# Patient Record
Sex: MALE | Race: WHITE | NOT HISPANIC OR LATINO | Employment: FULL TIME | URBAN - METROPOLITAN AREA
[De-identification: names, ages, dates, MRNs, and addresses within clinical notes are randomized per-mention and may not be internally consistent; named-entity substitution may affect disease eponyms.]

---

## 2017-01-16 ENCOUNTER — ALLSCRIPTS OFFICE VISIT (OUTPATIENT)
Dept: OTHER | Facility: OTHER | Age: 27
End: 2017-01-16

## 2017-01-16 ENCOUNTER — TRANSCRIBE ORDERS (OUTPATIENT)
Dept: ADMINISTRATIVE | Facility: HOSPITAL | Age: 27
End: 2017-01-16

## 2017-01-16 DIAGNOSIS — G47.19 TRANSIENT DISORDER OF INITIATING OR MAINTAINING WAKEFULNESS: Primary | ICD-10-CM

## 2017-01-16 DIAGNOSIS — F41.0 PANIC DISORDER WITHOUT AGORAPHOBIA: ICD-10-CM

## 2017-02-16 ENCOUNTER — HOSPITAL ENCOUNTER (OUTPATIENT)
Dept: SLEEP CENTER | Facility: CLINIC | Age: 27
Discharge: HOME | End: 2017-02-16
Payer: COMMERCIAL

## 2017-02-16 DIAGNOSIS — F41.0 PANIC DISORDER WITHOUT AGORAPHOBIA: ICD-10-CM

## 2017-02-16 DIAGNOSIS — G47.19 TRANSIENT DISORDER OF INITIATING OR MAINTAINING WAKEFULNESS: ICD-10-CM

## 2017-02-16 PROCEDURE — 95810 POLYSOM 6/> YRS 4/> PARAM: CPT

## 2017-02-21 ENCOUNTER — TRANSCRIBE ORDERS (OUTPATIENT)
Dept: SLEEP CENTER | Facility: CLINIC | Age: 27
End: 2017-02-21

## 2017-02-21 DIAGNOSIS — G47.61 PERIODIC LIMB MOVEMENTS OF SLEEP: Primary | ICD-10-CM

## 2017-03-21 ENCOUNTER — HOSPITAL ENCOUNTER (OUTPATIENT)
Dept: SLEEP CENTER | Facility: CLINIC | Age: 27
Discharge: HOME | End: 2017-03-21
Payer: COMMERCIAL

## 2017-03-21 ENCOUNTER — TRANSCRIBE ORDERS (OUTPATIENT)
Dept: SLEEP CENTER | Facility: CLINIC | Age: 27
End: 2017-03-21

## 2017-03-21 DIAGNOSIS — G47.33 OSA (OBSTRUCTIVE SLEEP APNEA): Primary | ICD-10-CM

## 2017-03-21 DIAGNOSIS — G47.61 PERIODIC LIMB MOVEMENTS OF SLEEP: ICD-10-CM

## 2017-03-25 ENCOUNTER — APPOINTMENT (OUTPATIENT)
Dept: LAB | Facility: MEDICAL CENTER | Age: 27
End: 2017-03-25
Payer: COMMERCIAL

## 2017-03-25 ENCOUNTER — TRANSCRIBE ORDERS (OUTPATIENT)
Dept: ADMINISTRATIVE | Facility: HOSPITAL | Age: 27
End: 2017-03-25

## 2017-03-25 DIAGNOSIS — D64.9 ANEMIA, UNSPECIFIED TYPE: Primary | ICD-10-CM

## 2017-03-25 LAB — FERRITIN SERPL-MCNC: 142 NG/ML (ref 8–388)

## 2017-03-25 PROCEDURE — 82728 ASSAY OF FERRITIN: CPT | Performed by: INTERNAL MEDICINE

## 2017-03-25 PROCEDURE — 36415 COLL VENOUS BLD VENIPUNCTURE: CPT | Performed by: INTERNAL MEDICINE

## 2017-05-03 ENCOUNTER — TRANSCRIBE ORDERS (OUTPATIENT)
Dept: SLEEP CENTER | Facility: CLINIC | Age: 27
End: 2017-05-03

## 2017-05-03 ENCOUNTER — HOSPITAL ENCOUNTER (OUTPATIENT)
Dept: SLEEP CENTER | Facility: CLINIC | Age: 27
Discharge: HOME | End: 2017-05-03
Payer: COMMERCIAL

## 2017-05-03 DIAGNOSIS — G47.33 OSA (OBSTRUCTIVE SLEEP APNEA): ICD-10-CM

## 2017-05-03 DIAGNOSIS — G47.61 PLMD (PERIODIC LIMB MOVEMENT DISORDER): Primary | ICD-10-CM

## 2017-10-23 ENCOUNTER — ALLSCRIPTS OFFICE VISIT (OUTPATIENT)
Dept: OTHER | Facility: OTHER | Age: 27
End: 2017-10-23

## 2017-10-24 NOTE — PROGRESS NOTES
Assessment  1  Panic disorder (300 01) (F41 0)   2  Restless legs syndrome (333 94) (G25 81)    Plan  Insomnia    · Venlafaxine HCl  MG Oral Tablet Extended Release 24 Hour; 1 Tab daily  Restless legs syndrome    · Gabapentin 300 MG Oral Capsule; take 1 capsule at bedtime nightly    Discussion/Summary  Discussion Summary:   CONTINUE VENLAFAXINE DAILY- FOLLOW UP IN 6-8 MONTHS- INCREASE TO GABAPENTIN  - AND UP  MG DAILY; NO BETTER CONSIDER MIRAPEX OR PRAMIPEXOLEUP IN 6-8 MONTHSHAS SEEN DR Shay Engle- SLEEP STUDY WAS DONE- NOTES REVIEWED; TO QUIT SMOKING- HE IS WEANING DOWN ON CIGARETTES  Chief Complaint  Chief Complaint Free Text Note Form: patient here for follow upWAS SEEN BY ALLERGIST- HE WAS PUT ON SINGULAIR - NO CHANGE BUT HE STOPPPED SNORING; History of Present Illness  Restless Legs Syndrome: The patient is being seen for follow-up from urgent care for restless legs syndrome  Symptoms:  spontaneous leg movements,-- urge to move legs-- and-- sleep disruption, but-- no abnormal sensation in legs,-- no leg numbness,-- no leg pain,-- no fatigue,-- no anxiety-- and-- no daytime somnolence  The patient is currently experiencing symptoms  Current treatment includes caffeine avoidance and bed time stretching  By report, there is good adherence with treatment, fair tolerance of treatment and fair symptom control  HPI: HE HAD SLEEP STUDY DONE- WAS PUT ON GABASPENTIN FOR RLS; HE FAILED MONTELUKAST FOR ALLERGY SX- BUT IT DID HELP HIS SNORING- HE HAD SLEEP STUDY- REVIEWED; Panic Disorder (Follow-Up): The patient is being seen for follow-up of panic disorder  The patient reports doing well  He has had no significant interval events  Interval symptoms:  denies panic attacks,-- denies fear of dying,-- denies fear of subsequent attacks,-- denies phobic avoidance behavior-- and-- denies suicidal ideation     Associated symptoms: no palpitations,-- no chest discomfort,-- no trouble breathing,-- no trembling,-- no paresthesias,-- no dizziness,-- no lump in throat,-- no nausea-- and-- no hot flashes  Medications:  the patient is adherent to his medication regimen, but-- he denies medication side effects  Disease management:  the patient is doing well with his goals  Review of Systems  Complete-Male:   Constitutional: No fever or chills, feels well, no tiredness, no recent weight gain or weight loss  Eyes: No complaints of eye pain, no red eyes, no discharge from eyes, no itchy eyes  ENT: no complaints of earache, no hearing loss, no nosebleeds, no nasal discharge, no sore throat, no hoarseness  Cardiovascular: No complaints of slow heart rate, no fast heart rate, no chest pain, no palpitations, no leg claudication, no lower extremity  Respiratory: No complaints of shortness of breath, no wheezing, no cough, no SOB on exertion, no orthopnea or PND  Gastrointestinal: No complaints of abdominal pain, no constipation, no nausea or vomiting, no diarrhea or bloody stools  Genitourinary: No complaints of dysuria, no incontinence, no hesitancy, no nocturia, no genital lesion, no testicular pain  Musculoskeletal: No complaints of arthralgia, no myalgias, no joint swelling or stiffness, no limb pain or swelling  Integumentary: No complaints of skin rash or skin lesions, no itching, no skin wound, no dry skin  Neurological: No compliants of headache, no confusion, no convulsions, no numbness or tingling, no dizziness or fainting, no limb weakness, no difficulty walking  Psychiatric: sleep disturbances-- and-- RLS, but-- Is not suicidal, no sleep disturbances, no anxiety or depression, no change in personality, no emotional problems-- and-- as noted in HPI  Endocrine: No complaints of proptosis, no hot flashes, no muscle weakness, no erectile dysfunction, no deepening of the voice, no feelings of weakness,-- no proptosis-- and-- no hot flashes     Hematologic/Lymphatic: No complaints of swollen glands, no swollen glands in the neck, does not bleed easily, no easy bruising,-- no tendency for easy bleeding-- and-- no tendency for easy bruising  ROS Reviewed:   ROS reviewed  Active Problems  1  Daytime hypersomnolence (780 54) (G47 19)   2  Insomnia (780 52) (G47 00)   3  Need for influenza vaccination (V04 81) (Z23)   4  Panic disorder (300 01) (F41 0)   5  Seasonal allergies (477 9) (J30 2)   6  Tobacco use (305 1) (Z72 0)    Past Medical History  1  History of Neck pain (723 1) (M54 2)   2  History of Rotator cuff tendinitis, unspecified laterality (726 10) (M75 80)  Active Problems And Past Medical History Reviewed: The active problems and past medical history were reviewed and updated today  Surgical History  1  History of Tonsillectomy With Adenoidectomy  Surgical History Reviewed: The surgical history was reviewed and updated today  Family History  Mother    1  Family history of Rheumatoid Arthritis  Father    2  Family history of Type 2 Diabetes Mellitus  Family History    3  Family history of Depression  Family History Reviewed: The family history was reviewed and updated today  Social History   · Current Every Day Smoker (305 1)   · Tobacco use (305 1) (Z72 0)  Social History Reviewed: The social history was reviewed and updated today  The social history was reviewed and is unchanged  Current Meds   1  ClonazePAM 0 5 MG Oral Tablet; 1-2  Q8 HOURS PRN ANXIETY- THIS REPLACES ATIVAN; Therapy: 21Jun2016 to (Evaluate:[id number]); Last Rx:21Jun2016 Ordered   2  Venlafaxine HCl  MG Oral Tablet Extended Release 24 Hour; 1 Tab daily; Therapy: [id number] to 9845 8544)  Requested for: [id number]; Last Rx:16Jan2017   Ordered  Medication List Reviewed: The medication list was reviewed and updated today  Allergies  1   No Known Drug Allergies    Vitals  Vital Signs    Recorded: [id number] 08:00AM   Temperature 97 F   Heart Rate 74   Respiration 16 Systolic 860   Diastolic 78   Height 5 ft 10 in   Weight 241 lb    BMI Calculated 34 58   BSA Calculated 2 26     Physical Exam    Constitutional   General appearance: No acute distress, well appearing and well nourished  Eyes   Conjunctiva and lids: No swelling, erythema, or discharge  Pupils and irises: Equal, round and reactive to light  Ears, Nose, Mouth, and Throat   External inspection of ears and nose: Normal     Oropharynx: Normal with no erythema, edema, exudate or lesions  -- CROWDED  Pulmonary   Respiratory effort: No increased work of breathing or signs of respiratory distress  Auscultation of lungs: Clear to auscultation, equal breath sounds bilaterally, no wheezes, no rales, no rhonci  Cardiovascular   Palpation of heart: Normal PMI, no thrills  Auscultation of heart: Normal rate and rhythm, normal S1 and S2, without murmurs  Examination of extremities for edema and/or varicosities: Normal     Carotid pulses: Normal     Abdomen   Abdomen: Non-tender, no masses  Liver and spleen: No hepatomegaly or splenomegaly  Lymphatic   Palpation of lymph nodes in neck: No lymphadenopathy  Musculoskeletal   Gait and station: Normal     Digits and nails: Normal without clubbing or cyanosis  Inspection/palpation of joints, bones, and muscles: Normal     Skin   Skin and subcutaneous tissue: Normal without rashes or lesions  Neurologic   Cranial nerves: Cranial nerves 2-12 intact  Reflexes: 2+ and symmetric  Sensation: No sensory loss  Psychiatric   Orientation to person, place and time: Normal          Results/Data  PHQ-2 Adult Depression Screening 23Oct2017 08:05AM User, [facility]     Test Name Result Flag Reference   PHQ-2 Adult Depression Score 0     Over the last two weeks, how often have you been bothered by any of the following problems?   Little interest or pleasure in doing things: Not at all - 0  Feeling down, depressed, or hopeless: Not at all - 0   PHQ-2 Adult Depression Screening Negative         Signatures   Electronically signed by :  Izabella 89, DO; Oct 23 2017  8:41AM EST                       (Author)

## 2018-01-10 NOTE — PROGRESS NOTES
Assessment    1  URI (upper respiratory infection) (465 9) (J06 9)    Plan  URI (upper respiratory infection)    · Azithromycin 250 MG Oral Tablet; TAKE 2 TABLETS ON DAY 1 THEN TAKE 1  TABLET A DAY FOR 4 DAYS   · Fluticasone Propionate 50 MCG/ACT Nasal Suspension; USE 2 SPRAYS IN EACH  NOSTRIL ONCE DAILY   · PredniSONE 10 MG Oral Tablet; 1 PO BID FOR 3 DAYS    Discussion/Summary    ADD ABX  ADD PRED  ADD NASAL SPRAY  CALL IF NO BETTER  The patient was counseled regarding diagnostic results, instructions for management, risk factor reductions, prognosis, patient and family education, impressions, risks and benefits of treatment options, importance of compliance with treatment  Chief Complaint    1  Cold Symptoms  PATIENT WITH RUNNY STUFFY NOSE; FOR 4 DAYS;   HE HAS NO FEVER; HE USED DAYQUIL AND MUCINEX - NO HELP;      History of Present Illness  HPI: PATIENT HAS URI SYMTPOMS - HE IS A CURRENT EVERY DAY SMOKER; HE GETS A PULSE SENSATION IN HIS RIGHT EYE   Cold Symptoms: Merlin Rouleau presents with complaints of cold symptoms  Associated symptoms include nasal congestion, runny nose, post nasal drainage, dry cough, facial pressure, facial pain and plugged ear(s), but no sneezing, no scratchy throat, no sore throat, no hoarseness, no productive cough, no headache, no ear pain, no swollen lymph nodes, no wheezing, no shortness of breath, no fatigue, no weakness, no nausea, no vomiting, no diarrhea and no fever  Review of Systems    Constitutional: feeling poorly and feeling tired, but as noted in HPI    ENT: earache, nasal discharge and hoarseness, but as noted in HPI and no sore throat  Respiratory: cough, but no complaints of shortness of breath, no wheezing or cough, no dyspnea on exertion, no orthopnea or PND and as noted in HPI  ROS reviewed  Active Problems    1  Cellulitis, toe (681 10) (L03 039)   2  Insomnia (780 52) (G47 00)   3  Left ankle sprain (845 00) (S9 402A)   4   Tobacco use (305  1) (Z72 0)   5  URI (upper respiratory infection) (465 9) (J06 9)    Social History    · Current Every Day Smoker (305 1)   · Tobacco use (305 1) (Z72 0)    Family History  Family History Reviewed: The family history was reviewed and updated today  Current Meds   1  No Reported Medications Recorded    Allergies    1  No Known Drug Allergies    Vitals   Recorded: 63KLX4019 03:38PM   Temperature 96 4 F   Heart Rate 72   Respiration 16   Systolic 025   Diastolic 88   Height 5 ft 10 in   Weight 225 lb 6 08 oz   BMI Calculated 32 34   BSA Calculated 2 2     Physical Exam    Constitutional   General appearance: No acute distress, well appearing and well nourished  Eyes   Conjunctiva and lids: No swelling, erythema, or discharge  SCLERA CLEAR  Pupils and irises: Equal, round and reactive to light  Ears, Nose, Mouth, and Throat   External inspection of ears and nose: Normal     Otoscopic examination: Tympanic membrance translucent with normal light reflex  Canals patent without erythema  Nasal mucosa, septum, and turbinates: Abnormal   NASAL CONGESTION  Oropharynx: Normal with no erythema, edema, exudate or lesions  SOME POST NASAL DRIP  Pulmonary   Respiratory effort: No increased work of breathing or signs of respiratory distress  Auscultation of lungs: Clear to auscultation, equal breath sounds bilaterally, no wheezes, no rales, no rhonci  CLEAR B/L  Cardiovascular   Palpation of heart: Normal PMI, no thrills  REGULAR  Auscultation of heart: Normal rate and rhythm, normal S1 and S2, without murmurs  Examination of extremities for edema and/or varicosities: Normal     Carotid pulses: Normal     Abdomen   Abdomen: Non-tender, no masses  Liver and spleen: No hepatomegaly or splenomegaly  Lymphatic   Palpation of lymph nodes in neck: No lymphadenopathy  Musculoskeletal   Gait and station: Normal     Digits and nails: Normal without clubbing or cyanosis  Inspection/palpation of joints, bones, and muscles: Normal     Skin   Skin and subcutaneous tissue: Normal without rashes or lesions  Neurologic   Cranial nerves: Cranial nerves 2-12 intact  Reflexes: 2+ and symmetric  Sensation: No sensory loss  Psychiatric   Orientation to person, place and time: Normal     Mood and affect: Normal          Signatures   Electronically signed by :  Estela Berry DO; Feb  3 2016 11:30AM EST                       (Author)

## 2018-01-14 VITALS
RESPIRATION RATE: 16 BRPM | BODY MASS INDEX: 34.5 KG/M2 | HEART RATE: 74 BPM | WEIGHT: 241 LBS | TEMPERATURE: 97 F | DIASTOLIC BLOOD PRESSURE: 78 MMHG | HEIGHT: 70 IN | SYSTOLIC BLOOD PRESSURE: 118 MMHG

## 2018-01-15 VITALS
DIASTOLIC BLOOD PRESSURE: 84 MMHG | BODY MASS INDEX: 33.01 KG/M2 | RESPIRATION RATE: 16 BRPM | SYSTOLIC BLOOD PRESSURE: 124 MMHG | HEIGHT: 70 IN | TEMPERATURE: 96.7 F | WEIGHT: 230.6 LBS | HEART RATE: 72 BPM

## 2018-01-15 NOTE — MISCELLANEOUS
Message  Return to work or school:   Miladis Ybarra is under my professional care  He was seen in my office on   He is able to return to work on  05/27/2016    He can perform work without limitations           Signatures   Electronically signed by : MARITZA Carvajal ; May 20 2016  4:28PM EST                       (Author)

## 2018-01-15 NOTE — RESULT NOTES
Verified Results  * MRI BRAIN W WO CONTRAST [id number] 07:18PM Shayna Liu Order Number: [id number]     Test Name Result Flag Reference   MRI BRAIN W [address] (Report)     MRI BRAIN WITH AND WITHOUT CONTRAST     INDICATION: 29-year-old male, dizziness, giddiness   COMPARISON: 4/14/2004 CT     TECHNIQUE:   Sagittal T1, axial T2, axial FLAIR, axial T1, axial Gradient, axial diffusion  Sagittal, axial and coronal T1 postcontrast  Axial BRAVO post contrast     10 mL of Gadavist was injected intravenously without immediate consequence  IMAGE QUALITY:  Diagnostic  FINDINGS:     BRAIN PARENCHYMA: There is no discrete mass, mass effect or midline shift  No abnormal white matter signal identified  Brainstem and cerebellum demonstrate normal signal  There is no intracranial hemorrhage  There is no evidence of acute infarction and   diffusion imaging is unremarkable  Postcontrast imaging of the brain demonstrates no abnormal enhancement  VENTRICLES: Normal      SELLA AND PITUITARY GLAND: Normal      ORBITS: Normal      PARANASAL SINUSES: Mild pansinus mucosal thickening  No air-fluid levels  VASCULATURE: Evaluation of the major intracranial vasculature demonstrates appropriate flow voids  CALVARIUM AND SKULL BASE: Normal      EXTRACRANIAL SOFT TISSUES: Normal        IMPRESSION:   Mild pansinus disease  No air-fluid levels       No significant intracranial abnormalities identified, consistent with prior CT           Workstation performed: [id number]     Signed by:   Shakila Gomes MD   5/17/16

## 2018-03-23 DIAGNOSIS — F34.1 DYSTHYMIA: Primary | ICD-10-CM

## 2018-03-23 RX ORDER — VENLAFAXINE HYDROCHLORIDE 225 MG/1
TABLET, EXTENDED RELEASE ORAL
Qty: 90 TABLET | Refills: 1 | Status: SHIPPED | OUTPATIENT
Start: 2018-03-23 | End: 2018-09-18 | Stop reason: SDUPTHER

## 2018-07-19 ENCOUNTER — TELEPHONE (OUTPATIENT)
Dept: FAMILY MEDICINE CLINIC | Facility: CLINIC | Age: 28
End: 2018-07-19

## 2018-07-19 NOTE — TELEPHONE ENCOUNTER
Patient called back and scheduled an appointment  Did not want to see anyone but you  He needed an evening appointment, first avail was 09/18   He wants to know if you will give him just a few to hold him over

## 2018-07-19 NOTE — TELEPHONE ENCOUNTER
Patient called for a refill on a medication that had not been refilled in 2 years  Patient has not been seen since 10/2017  As per Dr Amol Llamas, patient needs an appointment before refill can be given  (he can be scheduled with any doctor)  Left message for pt to return call

## 2018-09-18 ENCOUNTER — OFFICE VISIT (OUTPATIENT)
Dept: FAMILY MEDICINE CLINIC | Facility: CLINIC | Age: 28
End: 2018-09-18
Payer: COMMERCIAL

## 2018-09-18 VITALS
DIASTOLIC BLOOD PRESSURE: 84 MMHG | BODY MASS INDEX: 36.54 KG/M2 | HEIGHT: 71 IN | TEMPERATURE: 98.5 F | WEIGHT: 261 LBS | SYSTOLIC BLOOD PRESSURE: 128 MMHG

## 2018-09-18 DIAGNOSIS — R20.0 NUMBNESS AND TINGLING OF BOTH FEET: ICD-10-CM

## 2018-09-18 DIAGNOSIS — R20.2 NUMBNESS AND TINGLING OF BOTH FEET: ICD-10-CM

## 2018-09-18 DIAGNOSIS — F34.1 DYSTHYMIA: ICD-10-CM

## 2018-09-18 DIAGNOSIS — R20.2 NUMBNESS AND TINGLING IN BOTH HANDS: ICD-10-CM

## 2018-09-18 DIAGNOSIS — R20.0 NUMBNESS AND TINGLING IN BOTH HANDS: ICD-10-CM

## 2018-09-18 DIAGNOSIS — F41.0 PANIC DISORDER: Primary | ICD-10-CM

## 2018-09-18 PROCEDURE — 1036F TOBACCO NON-USER: CPT | Performed by: INTERNAL MEDICINE

## 2018-09-18 PROCEDURE — 3008F BODY MASS INDEX DOCD: CPT | Performed by: INTERNAL MEDICINE

## 2018-09-18 PROCEDURE — 99213 OFFICE O/P EST LOW 20 MIN: CPT | Performed by: INTERNAL MEDICINE

## 2018-09-18 RX ORDER — GABAPENTIN 300 MG/1
CAPSULE ORAL
COMMUNITY
Start: 2017-10-23 | End: 2018-09-18 | Stop reason: SDUPTHER

## 2018-09-18 RX ORDER — VENLAFAXINE HYDROCHLORIDE 225 MG/1
225 TABLET, EXTENDED RELEASE ORAL DAILY
Qty: 90 TABLET | Refills: 2 | Status: SHIPPED | OUTPATIENT
Start: 2018-09-18 | End: 2018-12-14

## 2018-09-18 RX ORDER — GABAPENTIN 300 MG/1
300 CAPSULE ORAL 3 TIMES DAILY
Qty: 270 CAPSULE | Refills: 0 | Status: SHIPPED | OUTPATIENT
Start: 2018-09-18 | End: 2018-09-18 | Stop reason: SDUPTHER

## 2018-09-18 RX ORDER — VENLAFAXINE HYDROCHLORIDE 225 MG/1
225 TABLET, EXTENDED RELEASE ORAL DAILY
Qty: 90 TABLET | Refills: 0 | Status: SHIPPED | OUTPATIENT
Start: 2018-09-18 | End: 2019-01-22 | Stop reason: SDUPTHER

## 2018-09-18 RX ORDER — CLONAZEPAM 0.5 MG/1
TABLET ORAL
COMMUNITY
Start: 2016-06-21 | End: 2019-01-22

## 2018-09-18 RX ORDER — GABAPENTIN 300 MG/1
300 CAPSULE ORAL 3 TIMES DAILY
Qty: 270 CAPSULE | Refills: 0 | Status: SHIPPED | OUTPATIENT
Start: 2018-09-18 | End: 2018-11-19 | Stop reason: SDUPTHER

## 2018-09-18 RX ORDER — VENLAFAXINE HYDROCHLORIDE 225 MG/1
1 TABLET, EXTENDED RELEASE ORAL DAILY
COMMUNITY
Start: 2017-01-16 | End: 2018-09-18 | Stop reason: SDUPTHER

## 2018-09-18 NOTE — PROGRESS NOTES
ASSESSMENT and PLAN:  Jose Howard is a 33 y o  male with:   Problem List Items Addressed This Visit     Panic disorder - Primary     He is on venlafaxine 225 mg daily  Consider referral to psychiatry  Increase gabapentin to 300 tid- slow titration  If no better consider changing/titrating up duloxitine   Concentration issues may be due to partially treated anxiety   Could have an element of adhd or bipolar disorder-  Follow up for change in meds if no better- pt to call with update  he uses clonazepam rarely         Relevant Medications    venlafaxine 225 MG TB24    venlafaxine 225 MG TB24    gabapentin (NEURONTIN) 300 mg capsule    Numbness and tingling in both hands     Consider labs  Increase gabapentin  Follow up   Add vitamin b6         Relevant Medications    gabapentin (NEURONTIN) 300 mg capsule    Numbness and tingling of both feet     Increase gabapentin  If nob mono check labs, consider neurology referral    Add vitamin b6 50 mg daily         Relevant Medications    gabapentin (NEURONTIN) 300 mg capsule    Dysthymia    Relevant Medications    venlafaxine 225 MG TB24    venlafaxine 225 MG TB24    gabapentin (NEURONTIN) 300 mg capsule          SUBJECTIVE:  Jose Howard is a 33 y o  male who presents today with a chief complaint of No chief complaint on file  Patient here for follow up  He has a few concerns- his hands nad feet are numb "all the time  He quit smoking- he gained weight   He gets sudden onset of anger - out of the blue- or he gets very sad all of a sudden- has been for year  Worse lately  He has troulbe focusing; he is going for his bachelors degree  Review of Systems   Constitutional: Negative  HENT: Negative  Eyes: Negative  Respiratory: Negative  Cardiovascular: Negative  Gastrointestinal: Negative  Endocrine: Negative  Genitourinary: Negative  Musculoskeletal: Negative  Skin: Negative  Allergic/Immunologic: Negative      Neurological: Positive for numbness (numbness of hands and feet )  Negative for seizures, syncope and speech difficulty  Psychiatric/Behavioral: Positive for agitation (pt gets sudden onset of anger, he gets anry or sad- the next day he is fine ) and behavioral problems  The patient is nervous/anxious  I have reviewed the patient's PMH, Social History, Medication List and Allergies  OBJECTIVE:  /84   Temp 98 5 °F (36 9 °C)   Ht 5' 11" (1 803 m)   Wt 118 kg (261 lb)   BMI 36 40 kg/m²   Physical Exam   Constitutional: He is oriented to person, place, and time  He appears well-developed and well-nourished  HENT:   Head: Normocephalic and atraumatic  Right Ear: External ear normal    Left Ear: External ear normal    Nose: Nose normal    Mouth/Throat: Oropharynx is clear and moist    Eyes: Conjunctivae and EOM are normal  Pupils are equal, round, and reactive to light  Neck: Normal range of motion  Neck supple  No JVD present  No tracheal deviation present  No thyromegaly present  Cardiovascular: Normal rate, regular rhythm, normal heart sounds and intact distal pulses  Pulmonary/Chest: Effort normal and breath sounds normal  No respiratory distress  He has no wheezes  Abdominal: Soft  Bowel sounds are normal  He exhibits no distension  There is no tenderness  Musculoskeletal: Normal range of motion  He exhibits no edema, tenderness or deformity  Neurological: He is alert and oriented to person, place, and time  He has normal reflexes  No cranial nerve deficit  Coordination normal    No numbness; no phalen, normal tinel    Skin: Skin is warm and dry  No rash noted  No erythema  Psychiatric: He has a normal mood and affect  His behavior is normal  Judgment and thought content normal    Nursing note and vitals reviewed

## 2018-09-18 NOTE — PATIENT INSTRUCTIONS
Increase gabapentin to 300 mg twice a day- then to three a day after 2 weeks  Update me in a month   Add vitamin b6 50 mg at night  Use cock up splints on wrists at night to prevent bending of wrists

## 2018-09-18 NOTE — ASSESSMENT & PLAN NOTE
He is on venlafaxine 225 mg daily  Consider referral to psychiatry  Increase gabapentin to 300 tid- slow titration  If no better consider changing/titrating up duloxitine   Concentration issues may be due to partially treated anxiety   Could have an element of adhd or bipolar disorder-  Follow up for change in meds if no better- pt to call with update  he uses clonazepam rarely

## 2018-09-18 NOTE — ASSESSMENT & PLAN NOTE
Increase gabapentin  If nob mono check labs, consider neurology referral    Add vitamin b6 50 mg daily

## 2018-11-17 ENCOUNTER — OFFICE VISIT (OUTPATIENT)
Dept: URGENT CARE | Facility: MEDICAL CENTER | Age: 28
End: 2018-11-17
Payer: COMMERCIAL

## 2018-11-17 VITALS
DIASTOLIC BLOOD PRESSURE: 90 MMHG | WEIGHT: 263.6 LBS | SYSTOLIC BLOOD PRESSURE: 150 MMHG | OXYGEN SATURATION: 95 % | HEART RATE: 92 BPM | BODY MASS INDEX: 36.9 KG/M2 | HEIGHT: 71 IN | RESPIRATION RATE: 18 BRPM | TEMPERATURE: 97.1 F

## 2018-11-17 DIAGNOSIS — J45.21 MILD INTERMITTENT ASTHMATIC BRONCHITIS WITH ACUTE EXACERBATION: Primary | ICD-10-CM

## 2018-11-17 LAB
ATRIAL RATE: 82 BPM
P AXIS: 32 DEGREES
PR INTERVAL: 124 MS
QRS AXIS: 6 DEGREES
QRSD INTERVAL: 98 MS
QT INTERVAL: 380 MS
QTC INTERVAL: 443 MS
T WAVE AXIS: 15 DEGREES
VENTRICULAR RATE: 82 BPM

## 2018-11-17 PROCEDURE — 93005 ELECTROCARDIOGRAM TRACING: CPT | Performed by: PHYSICIAN ASSISTANT

## 2018-11-17 PROCEDURE — 99213 OFFICE O/P EST LOW 20 MIN: CPT | Performed by: PHYSICIAN ASSISTANT

## 2018-11-17 PROCEDURE — 94640 AIRWAY INHALATION TREATMENT: CPT | Performed by: PHYSICIAN ASSISTANT

## 2018-11-17 PROCEDURE — 93010 ELECTROCARDIOGRAM REPORT: CPT | Performed by: INTERNAL MEDICINE

## 2018-11-17 RX ORDER — ALBUTEROL SULFATE 90 UG/1
2 AEROSOL, METERED RESPIRATORY (INHALATION) EVERY 6 HOURS PRN
Qty: 18 G | Refills: 0 | Status: SHIPPED | OUTPATIENT
Start: 2018-11-17 | End: 2019-01-22

## 2018-11-17 RX ORDER — IPRATROPIUM BROMIDE AND ALBUTEROL SULFATE 2.5; .5 MG/3ML; MG/3ML
3 SOLUTION RESPIRATORY (INHALATION) ONCE
Status: COMPLETED | OUTPATIENT
Start: 2018-11-17 | End: 2018-11-17

## 2018-11-17 RX ORDER — PREDNISONE 20 MG/1
20 TABLET ORAL 2 TIMES DAILY WITH MEALS
Qty: 10 TABLET | Refills: 0 | Status: SHIPPED | OUTPATIENT
Start: 2018-11-17 | End: 2018-11-22

## 2018-11-17 RX ORDER — IPRATROPIUM BROMIDE AND ALBUTEROL SULFATE 2.5; .5 MG/3ML; MG/3ML
3 SOLUTION RESPIRATORY (INHALATION)
Status: DISCONTINUED | OUTPATIENT
Start: 2018-11-17 | End: 2018-11-17

## 2018-11-17 RX ADMIN — IPRATROPIUM BROMIDE AND ALBUTEROL SULFATE 3 ML: 2.5; .5 SOLUTION RESPIRATORY (INHALATION) at 09:06

## 2018-11-17 NOTE — PROGRESS NOTES
Pt  C/O SOB with going up the stairs for the past couple days  He C/O cough and chest tightness since this morning

## 2018-11-17 NOTE — PROGRESS NOTES
[facility]        NAME: Rylan Royal is a 33 y o  male  : 1990    MRN: [id number]  DATE: 2018  TIME: 9:27 AM    Assessment and Plan   Mild intermittent asthmatic bronchitis with acute exacerbation [J45 21]  1  Mild intermittent asthmatic bronchitis with acute exacerbation  POCT ECG    ipratropium-albuterol (DUO-NEB) 0 5-2 5 mg/3 mL inhalation solution 3 mL    predniSONE 20 mg tablet    albuterol (VENTOLIN HFA) 90 mcg/act inhaler    DISCONTINUED: ipratropium-albuterol (DUO-NEB) 0 5-2 5 mg/3 mL inhalation solution 3 mL         Patient Instructions     1  Take Prednisone 20mg  1 tablet twice daily x 5 days  2  Use Ventolin HFA 2 puffs every 6-8 hours as needed until cough free  3  Follow up with PCP in 3-5 days for re-check of lungs  4  Proceed to  ER if symptoms worsen  Chief Complaint     Chief Complaint   Patient presents with    Shortness of Breath    Cough         History of Present Illness       The patient is a 39-year-old male who presents with a 2 day history of increasing chest tightness, shortness of breath and cough  Patient denies any nasal discharge, congestion or fever since the onset of his symptoms  He has no prior history of reactive airway disease or asthma  Patient denies any radiation of the pain into his shoulder, jaw or neck, he denies any nausea or vomiting  Review of Systems   Review of Systems   Constitutional: Negative  HENT: Negative  Respiratory: Positive for cough, chest tightness and shortness of breath  Cardiovascular: Negative  Gastrointestinal: Negative            Current Medications       Current Outpatient Prescriptions:     clonazePAM (KlonoPIN) 0 5 mg tablet, Take by mouth, Disp: , Rfl:     gabapentin (NEURONTIN) 300 mg capsule, Take 1 capsule (300 mg total) by mouth 3 (three) times a day, Disp: 270 capsule, Rfl: 0    venlafaxine 225 MG TB24, Take 1 tablet (225 mg total) by mouth daily, Disp: 90 tablet, Rfl: 0   albuterol (VENTOLIN HFA) 90 mcg/act inhaler, Inhale 2 puffs every 6 (six) hours as needed for wheezing, Disp: 18 g, Rfl: 0    predniSONE 20 mg tablet, Take 1 tablet (20 mg total) by mouth 2 (two) times a day with meals for 5 days, Disp: 10 tablet, Rfl: 0    venlafaxine 225 MG TB24, Take 1 tablet (225 mg total) by mouth daily, Disp: 90 tablet, Rfl: 2  No current facility-administered medications for this visit  Current Allergies     Allergies as of 11/17/2018    (No Known Allergies)            The following portions of the patient's history were reviewed and updated as appropriate: allergies, current medications, past family history, past medical history, past social history, past surgical history and problem list      Past Medical History:   Diagnosis Date    Rotator cuff tendinitis, left     last assessed 11/4/13  documented resolved 9/12/14       Past Surgical History:   Procedure Laterality Date    TONSILLECTOMY AND ADENOIDECTOMY      documented resolved       Family History   Problem Relation Age of Onset    Rheum arthritis Mother     Diabetes Father         DM2    Depression Family          Medications have been verified  Objective   /90   Pulse 92   Temp (!) 97 1 °F (36 2 °C) (Temporal)   Resp 18   Ht 5' 11" (1 803 m)   Wt 120 kg (263 lb 9 6 oz)   SpO2 95%   BMI 36 76 kg/m²        Physical Exam     Physical Exam   Constitutional: He appears well-developed and well-nourished  No distress  HENT:   Head: Normocephalic and atraumatic  Right Ear: Tympanic membrane and ear canal normal    Left Ear: Tympanic membrane and ear canal normal    Nose: Nose normal    Mouth/Throat: Uvula is midline, oropharynx is clear and moist and mucous membranes are normal    Cardiovascular: Normal rate, regular rhythm and normal heart sounds      Pulmonary/Chest:

## 2018-11-17 NOTE — PATIENT INSTRUCTIONS
1  Take Prednisone 20mg  1 tablet twice daily x 5 days  2  Use Ventolin HFA 2 puffs every 6-8 hours as needed until cough free  3  Follow up with PCP in 3-5 days for re-check of lungs  4  Proceed to  ER if symptoms worsen

## 2018-11-19 DIAGNOSIS — R20.2 NUMBNESS AND TINGLING OF BOTH FEET: ICD-10-CM

## 2018-11-19 DIAGNOSIS — R20.0 NUMBNESS AND TINGLING OF BOTH FEET: ICD-10-CM

## 2018-11-19 DIAGNOSIS — F34.1 DYSTHYMIA: ICD-10-CM

## 2018-11-19 DIAGNOSIS — R20.2 NUMBNESS AND TINGLING IN BOTH HANDS: ICD-10-CM

## 2018-11-19 DIAGNOSIS — R20.0 NUMBNESS AND TINGLING IN BOTH HANDS: ICD-10-CM

## 2018-11-19 DIAGNOSIS — F41.0 PANIC DISORDER: ICD-10-CM

## 2018-11-21 RX ORDER — GABAPENTIN 300 MG/1
CAPSULE ORAL
Qty: 270 CAPSULE | Refills: 0 | Status: SHIPPED | OUTPATIENT
Start: 2018-11-21 | End: 2019-01-22

## 2018-12-11 DIAGNOSIS — J11.1 INFLUENZA: Primary | ICD-10-CM

## 2018-12-11 RX ORDER — OSELTAMIVIR PHOSPHATE 75 MG/1
75 CAPSULE ORAL EVERY 12 HOURS SCHEDULED
Qty: 10 CAPSULE | Refills: 0 | Status: SHIPPED | OUTPATIENT
Start: 2018-12-11 | End: 2018-12-16

## 2018-12-13 ENCOUNTER — TELEPHONE (OUTPATIENT)
Dept: FAMILY MEDICINE CLINIC | Facility: CLINIC | Age: 28
End: 2018-12-13

## 2018-12-13 NOTE — TELEPHONE ENCOUNTER
Was seen Monday for the flu with his wife   Needs his worked excuse extended until 12/17/18     Please call when this is ready

## 2018-12-14 ENCOUNTER — OFFICE VISIT (OUTPATIENT)
Dept: URGENT CARE | Facility: CLINIC | Age: 28
End: 2018-12-14
Payer: COMMERCIAL

## 2018-12-14 ENCOUNTER — APPOINTMENT (OUTPATIENT)
Dept: RADIOLOGY | Facility: CLINIC | Age: 28
End: 2018-12-14
Payer: COMMERCIAL

## 2018-12-14 VITALS
BODY MASS INDEX: 37.21 KG/M2 | WEIGHT: 265.8 LBS | TEMPERATURE: 99.5 F | RESPIRATION RATE: 20 BRPM | HEIGHT: 71 IN | OXYGEN SATURATION: 100 % | DIASTOLIC BLOOD PRESSURE: 85 MMHG | HEART RATE: 113 BPM | SYSTOLIC BLOOD PRESSURE: 141 MMHG

## 2018-12-14 DIAGNOSIS — R06.02 SHORTNESS OF BREATH: Primary | ICD-10-CM

## 2018-12-14 DIAGNOSIS — R06.02 SHORTNESS OF BREATH: ICD-10-CM

## 2018-12-14 PROCEDURE — S9083 URGENT CARE CENTER GLOBAL: HCPCS | Performed by: NURSE PRACTITIONER

## 2018-12-14 PROCEDURE — 71046 X-RAY EXAM CHEST 2 VIEWS: CPT

## 2018-12-14 PROCEDURE — G0382 LEV 3 HOSP TYPE B ED VISIT: HCPCS | Performed by: NURSE PRACTITIONER

## 2018-12-14 RX ORDER — ALBUTEROL SULFATE 2.5 MG/3ML
2.5 SOLUTION RESPIRATORY (INHALATION) ONCE
Status: COMPLETED | OUTPATIENT
Start: 2018-12-14 | End: 2018-12-14

## 2018-12-14 RX ORDER — METHYLPREDNISOLONE 4 MG/1
TABLET ORAL
Qty: 21 TABLET | Refills: 0 | Status: SHIPPED | OUTPATIENT
Start: 2018-12-14 | End: 2019-01-22

## 2018-12-14 RX ORDER — ALBUTEROL SULFATE 2.5 MG/3ML
2.5 SOLUTION RESPIRATORY (INHALATION) EVERY 6 HOURS PRN
Qty: 25 VIAL | Refills: 0 | Status: SHIPPED | OUTPATIENT
Start: 2018-12-14 | End: 2019-01-22

## 2018-12-14 RX ADMIN — ALBUTEROL SULFATE 2.5 MG: 2.5 SOLUTION RESPIRATORY (INHALATION) at 08:38

## 2018-12-14 RX ADMIN — Medication 0.5 MG: at 08:39

## 2018-12-14 NOTE — PATIENT INSTRUCTIONS
Shortness of Breath   WHAT YOU NEED TO KNOW:   Shortness of breath is a feeling that you cannot get enough air when you breathe in  You may have this feeling only during activity, or all the time  Your symptoms can range from mild to severe  Shortness of breath may be a sign of a serious health condition that needs immediate care  DISCHARGE INSTRUCTIONS:   Return to the emergency department if:   · Your signs and symptoms are the same or worse within 24 hours of treatment  · The skin over your ribs or on your neck sinks in when you breathe  · You feel confused or dizzy  Contact your healthcare provider if:   · You have new or worsening symptoms  · You have questions or concerns about your condition or care  Medicines:   · Medicines  may be used to treat the cause of your symptoms  You may need medicine to treat a bacterial infection or reduce anxiety  Other medicines may be used to open your airway, reduce swelling, or remove extra fluid  If you have a heart condition, you may need medicine to help your heart beat more strongly or regularly  · Take your medicine as directed  Contact your healthcare provider if you think your medicine is not helping or if you have side effects  Tell him or her if you are allergic to any medicine  Keep a list of the medicines, vitamins, and herbs you take  Include the amounts, and when and why you take them  Bring the list or the pill bottles to follow-up visits  Carry your medicine list with you in case of an emergency  Manage shortness of breath:   · Create an action plan  You and your healthcare provider can work together to create a plan for how to handle shortness of breath  The plan can include daily activities, treatment changes, and what to do if you have severe breathing problems  · Lean forward on your elbows when you sit  This helps your lungs expand and may make it easier to breathe  · Use pursed-lip breathing any time you feel short of breath  Breathe in through your nose and then slowly breathe out through your mouth with your lips slightly puckered  It should take you twice as long to breathe out as it did to breathe in  · Do not smoke  Nicotine and other chemicals in cigarettes and cigars can cause lung damage and make shortness of breath worse  Ask your healthcare provider for information if you currently smoke and need help to quit  E-cigarettes or smokeless tobacco still contain nicotine  Talk to your healthcare provider before you use these products  · Reach or maintain a healthy weight  Your healthcare provider can help you create a safe weight loss plan if you are overweight  · Exercise as directed  Exercise can help your lungs work more easily  Exercise can also help you lose weight if needed  Try to get at least 30 minutes of exercise most days of the week  Follow up with your healthcare provider or specialist as directed:  Write down your questions so you remember to ask them during your visits  © 2017 [employer] Information is for End User's use only and may not be sold, redistributed or otherwise used for commercial purposes  All illustrations and images included in CareNotes® are the copyrighted property of [employer]  or [employer]  The above information is an  only  It is not intended as medical advice for individual conditions or treatments  Talk to your doctor, nurse or pharmacist before following any medical regimen to see if it is safe and effective for you

## 2018-12-14 NOTE — PROGRESS NOTES
Assessment/Plan    Shortness of breath [R06 02]  1  Shortness of breath  XR chest pa & lateral    albuterol inhalation solution 2 5 mg    ipratropium (ATROVENT) 0 02 % inhalation solution 0 5 mg    mometasone-formoterol (DULERA) 100-5 MCG/ACT inhaler    Methylprednisolone 4 MG TBPK    Nebulizer Supplies    albuterol (2 5 mg/3 mL) 0 083 % nebulizer solution     - given patient's past medical history, is advised of possibility of COPD  Also possibility sleep apnea  Advised to follow up with PCP for appropriate evaluation and referral or treatment  - prescribed nebulizer machine since he has difficulty using the inhaler   - prescribed steroid inhaler to help with shortness of breath and wheezing  - advised to continue using rescue inhaler as best as he can  - chest x-ray is negative for pneumonia        Subjective:  Presents to clinic with complaint of difficulty breathing     Patient ID: Brandon Shaver is a 33 y o  male  Reason For Visit / Chief Complaint  Chief Complaint   Patient presents with    Shortness of Breath     patient reports he had same symptoms a month ago, had EKG negative,neb treatment with improvement had flu last week, started with a cough, unable to use inhaler  Fever yesterday of 102, taking tamiflu  HPI  He was treated for flu-like symptoms recently, less than 1 week ago and is just finishing Tamiflu now  States all his flu-like symptoms have resolved  He continues to have shortness of breath which is persistent, and with some wheezing which is intermittent  The shortness of breath is at rest and with activity  EKG from a month ago was negative  He reports family history of sleep apnea, and lung disease with his mother  Patient smoked for about 8 years, initially 1 pack a day but what the end 2 packs a day  No longer smoking; stopped smoking about 11 months ago  Also previous history of stomach ulcer       Past Medical History:   Diagnosis Date    Flu     Rotator cuff tendinitis, left     last assessed 11/4/13  documented resolved 9/12/14       Past Surgical History:   Procedure Laterality Date    TONSILLECTOMY AND ADENOIDECTOMY      documented resolved       Family History   Problem Relation Age of Onset    Rheum arthritis Mother     Diabetes Father         DM2    Depression Family        Review of Systems   Constitutional: Positive for fatigue  Negative for appetite change and chills  HENT: Negative for congestion, hearing loss, rhinorrhea, sore throat and trouble swallowing  Respiratory: Positive for chest tightness, shortness of breath and wheezing  Cardiovascular: Negative for chest pain and palpitations  Gastrointestinal: Negative for nausea and vomiting  Neurological: Negative for dizziness  Objective:    /85   Pulse (!) 113   Temp 99 5 °F (37 5 °C)   Resp 20   Ht 5' 11" (1 803 m)   Wt 121 kg (265 lb 12 8 oz)   SpO2 100%   BMI 37 07 kg/m²     Physical Exam   HENT:   Right Ear: External ear normal    Left Ear: External ear normal    Mouth/Throat: No oropharyngeal exudate  Eyes: Conjunctivae and EOM are normal    Cardiovascular: Normal rate, regular rhythm and normal heart sounds  Pulmonary/Chest: Effort normal  No respiratory distress  He has no wheezes  Mild congestion in the bilateral lung fields  No wheezing  No increased effort  Lymphadenopathy:     He has no cervical adenopathy

## 2019-01-22 ENCOUNTER — OFFICE VISIT (OUTPATIENT)
Dept: FAMILY MEDICINE CLINIC | Facility: CLINIC | Age: 29
End: 2019-01-22
Payer: COMMERCIAL

## 2019-01-22 VITALS
HEART RATE: 86 BPM | SYSTOLIC BLOOD PRESSURE: 126 MMHG | HEIGHT: 71 IN | WEIGHT: 264.6 LBS | TEMPERATURE: 98.6 F | DIASTOLIC BLOOD PRESSURE: 86 MMHG | BODY MASS INDEX: 37.04 KG/M2

## 2019-01-22 DIAGNOSIS — F34.1 DYSTHYMIA: ICD-10-CM

## 2019-01-22 DIAGNOSIS — E66.01 CLASS 2 SEVERE OBESITY DUE TO EXCESS CALORIES WITH SERIOUS COMORBIDITY AND BODY MASS INDEX (BMI) OF 36.0 TO 36.9 IN ADULT (HCC): ICD-10-CM

## 2019-01-22 DIAGNOSIS — R06.02 SHORTNESS OF BREATH: Primary | ICD-10-CM

## 2019-01-22 DIAGNOSIS — G47.61 PERIODIC LIMB MOVEMENT SLEEP DISORDER: ICD-10-CM

## 2019-01-22 DIAGNOSIS — F41.9 ANXIETY: ICD-10-CM

## 2019-01-22 PROBLEM — R20.2 NUMBNESS AND TINGLING OF BOTH FEET: Status: RESOLVED | Noted: 2018-09-18 | Resolved: 2019-01-22

## 2019-01-22 PROBLEM — R20.0 NUMBNESS AND TINGLING OF BOTH FEET: Status: RESOLVED | Noted: 2018-09-18 | Resolved: 2019-01-22

## 2019-01-22 PROBLEM — G25.81 RESTLESS LEGS SYNDROME: Status: ACTIVE | Noted: 2017-10-23

## 2019-01-22 PROBLEM — R20.0 NUMBNESS AND TINGLING IN BOTH HANDS: Status: RESOLVED | Noted: 2018-09-18 | Resolved: 2019-01-22

## 2019-01-22 PROBLEM — R20.2 NUMBNESS AND TINGLING IN BOTH HANDS: Status: RESOLVED | Noted: 2018-09-18 | Resolved: 2019-01-22

## 2019-01-22 PROCEDURE — 99214 OFFICE O/P EST MOD 30 MIN: CPT | Performed by: FAMILY MEDICINE

## 2019-01-22 RX ORDER — VENLAFAXINE HYDROCHLORIDE 225 MG/1
225 TABLET, EXTENDED RELEASE ORAL DAILY
Qty: 90 TABLET | Refills: 0 | Status: SHIPPED | OUTPATIENT
Start: 2019-01-22 | End: 2019-06-29 | Stop reason: SDUPTHER

## 2019-01-22 NOTE — ASSESSMENT & PLAN NOTE
Doing well with Venlafaxine ER 225mg (max dose), but still has some break through anxiety at night  Has an Rx of Klonopin 0 5mg filled in July 2018 that he has used 3x since then  Advised to avoid use until his respiratory issues are better qualified  May need dual therapy for full anxiety, but can interfere PLMD evaluation, so defer for now

## 2019-01-22 NOTE — PROGRESS NOTES
FAMILY MEDICINE PROGRESS NOTE  Earl Hoyt 33 y o  male   DATE: January 22, 2019     ASSESSMENT and PLAN:  Earl Hoyt is a 33 y o  male with: Anxiety  Doing well with Venlafaxine ER 225mg (max dose), but still has some break through anxiety at night  Has an Rx of Klonopin 0 5mg filled in July 2018 that he has used 3x since then  Advised to avoid use until his respiratory issues are better qualified  May need dual therapy for full anxiety, but can interfere PLMD evaluation, so defer for now  Periodic limb movement sleep disorder  Pt had a sleep study in 2017 and saw Dr Nancy Verduzco at that time and his study was negative for ÓSCAR at that time, but showed severe PLMD  At that time, he was started on Gabapentin now on 300mg qHS without resolution of symptoms  Per note review, Dr Nancy Verduzco did not want to try a dopamine agonist due to the patient's history of compulsive gambling  I asked the patient today and he denies any history of gambling, I advised him to return to see Sleep Medicine because he may benefit from a dopamine agonist  Also, given his 40 pound weight gain, he may need another sleep study to rule out ÓSCAR given his persistent difficulty breathing, especially at night  Though ESS was 0  Shortness of breath  2 recent Urgent Care visits for "acute asthma exacerbations," and both times his symptoms improved with nebulizers  He has a 1ppd x 10 year smoking history, quit 1 year ago, so may have underlying obstructive disease  Normal CXR at urgent care  Possible ÓSCAR as above  Will check PFTs prior to Pulm appt in 1 month  If pulm work up negative, would check stress test     Patient agreeable with the plan and expressed understanding  I discucssed signs and symptoms for which to RTC, go to ER or seek urgent medical care         Class 2 severe obesity due to excess calories with serious comorbidity and body mass index (BMI) of 36 0 to 36 9 in adult [facility])  Wt Readings from Last 3 Encounters:   01/22/19 120 kg (264 lb 9 6 oz)   12/14/18 121 kg (265 lb 12 8 oz)   11/17/18 120 kg (263 lb 9 6 oz)     BMI Counseling: Body mass index is 36 9 kg/m²  Discussed the patient's BMI with him  The BMI is above average  BMI counseling and education was provided to the patient  Nutrition recommendations include reducing portion sizes, decreasing overall calorie intake, 3-5 servings of fruits/vegetables daily, consuming healthier snacks, decreasing soda and/or juice intake, moderation in carbohydrate intake and reducing intake of cholesterol  Exercise recommendations include moderate aerobic physical activity for 150 minutes/week and exercising 3-5 times per week  SUBJECTIVE:  Earl Hoyt is a 33 y o  male who presents today with a chief complaint of Follow-up  Pt is here for a medication refill appointment  1  He has had 2 appointments in the past few months for breathing difficulty  He was seen at urgent cares and given nebulizers which improved his symptoms  No history of asthma/COPD  He does still get episodes where he gets SOB with exertion, but other times he can be on a treadmill for 30 minutes without difficulty  He had seen an Allergist in the past and had what sounds like in office spirometry done, which gave a grade of "F," but didn't follow-up  Former smoker- smoked 1ppd x 10 years, occ 2 ppd  Quit almost 1 year ago! His mom had bronchiolitis obliterans and dad has ÓSCAR  2  Obesity- has gained 40 pounds since quitting smoking  Had a negative sleep study 2 years ago and had seen Dr Nancy Verduzco and diagnosed with severe periodic limb disorder, the urgent care physicians said that he should get another sleep study done  3  Anxiety- tried many medications that didn't work, doing Venlafaxine and it is helping with his anxiety, but still struggles at night sometimes  He will take a "super pill"-Klonopin when he can't fall asleep- last fill per PDMP 7/19/2018, has only used it 3x since that fill         Review of Systems Constitutional: Positive for diaphoresis  Respiratory: Positive for chest tightness and shortness of breath (with exertion only)  Negative for cough  Cardiovascular: Positive for palpitations  Negative for chest pain  Sitting and reading: Would never doze  Watching TV: Would never doze  Sitting, inactive in a public place (e g  a theatre or a meeting): Would never doze  As a passenger in a car for an hour without a break: Would never doze  Lying down to rest in the afternoon when circumstances permit: Would never doze  Sitting and talking to someone: Would never doze  Sitting quietly after a lunch without alcohol: Would never doze  In a car, while stopped for a few minutes in traffic: Would never doze  Total score: 0    I have reviewed the patient's PMH, Social History, Medication List and Allergies as appropriate  OBJECTIVE:  /86 (BP Location: Left arm, Patient Position: Sitting, Cuff Size: Large)   Pulse 86   Temp 98 6 °F (37 °C)   Ht 5' 11" (1 803 m)   Wt 120 kg (264 lb 9 6 oz)   BMI 36 90 kg/m²    Physical Exam   Constitutional: He appears well-developed and well-nourished  No distress  obese   Cardiovascular: Normal rate, regular rhythm and normal heart sounds  Pulmonary/Chest: Effort normal and breath sounds normal  No respiratory distress  He has no wheezes  He has no rales  Skin: He is not diaphoretic  Vitals reviewed  Airam Connelly MD    Note: Portions of the record may have been created with voice recognition software  Occasional wrong word or "sound a like" substitutions may have occurred due to the inherent limitations of voice recognition software  Read the chart carefully and recognize, using context, where substitutions have occurred

## 2019-01-22 NOTE — ASSESSMENT & PLAN NOTE
2 recent Urgent Care visits for "acute asthma exacerbations," and both times his symptoms improved with nebulizers  He has a 1ppd x 10 year smoking history, quit 1 year ago, so may have underlying obstructive disease  Normal CXR at urgent care  Possible ÓSCAR as above  Will check PFTs prior to Pulm appt in 1 month  If pulm work up negative, would check stress test     Patient agreeable with the plan and expressed understanding  I discucssed signs and symptoms for which to RTC, go to ER or seek urgent medical care

## 2019-01-22 NOTE — ASSESSMENT & PLAN NOTE
Pt had a sleep study in 2017 and saw Dr Kevin Melgar at that time and his study was negative for ÓSCAR at that time, but showed severe PLMD  At that time, he was started on Gabapentin now on 300mg qHS without resolution of symptoms  Per note review, Dr Kevin Melgar did not want to try a dopamine agonist due to the patient's history of compulsive gambling  I asked the patient today and he denies any history of gambling, I advised him to return to see Sleep Medicine because he may benefit from a dopamine agonist  Also, given his 40 pound weight gain, he may need another sleep study to rule out ÓSCAR given his persistent difficulty breathing, especially at night  Though ESS was 0

## 2019-01-22 NOTE — ASSESSMENT & PLAN NOTE
Wt Readings from Last 3 Encounters:   01/22/19 120 kg (264 lb 9 6 oz)   12/14/18 121 kg (265 lb 12 8 oz)   11/17/18 120 kg (263 lb 9 6 oz)     BMI Counseling: Body mass index is 36 9 kg/m²  Discussed the patient's BMI with him  The BMI is above average  BMI counseling and education was provided to the patient  Nutrition recommendations include reducing portion sizes, decreasing overall calorie intake, 3-5 servings of fruits/vegetables daily, consuming healthier snacks, decreasing soda and/or juice intake, moderation in carbohydrate intake and reducing intake of cholesterol  Exercise recommendations include moderate aerobic physical activity for 150 minutes/week and exercising 3-5 times per week

## 2019-01-28 ENCOUNTER — HOSPITAL ENCOUNTER (OUTPATIENT)
Dept: PULMONOLOGY | Facility: HOSPITAL | Age: 29
Discharge: HOME | End: 2019-01-28
Payer: COMMERCIAL

## 2019-01-28 DIAGNOSIS — R06.02 SHORTNESS OF BREATH: ICD-10-CM

## 2019-01-28 PROCEDURE — 94760 N-INVAS EAR/PLS OXIMETRY 1: CPT

## 2019-01-28 PROCEDURE — 94060 EVALUATION OF WHEEZING: CPT

## 2019-01-28 PROCEDURE — 94060 EVALUATION OF WHEEZING: CPT | Performed by: INTERNAL MEDICINE

## 2019-01-28 RX ORDER — ALBUTEROL SULFATE 2.5 MG/3ML
2.5 SOLUTION RESPIRATORY (INHALATION) ONCE
Status: COMPLETED | OUTPATIENT
Start: 2019-01-28 | End: 2019-01-28

## 2019-01-28 RX ADMIN — ALBUTEROL SULFATE 2.5 MG: 2.5 SOLUTION RESPIRATORY (INHALATION) at 07:08

## 2019-02-01 NOTE — PROGRESS NOTES
Your breathing tests showed you do have some obstructive lung disease  Follow-up with the lung doctors  If you have any questions or concerns, please call the office at [phone] or schedule a follow-up appointment

## 2019-02-08 ENCOUNTER — OFFICE VISIT (OUTPATIENT)
Dept: URGENT CARE | Facility: MEDICAL CENTER | Age: 29
End: 2019-02-08
Payer: COMMERCIAL

## 2019-02-08 PROCEDURE — 99213 OFFICE O/P EST LOW 20 MIN: CPT

## 2019-02-13 ENCOUNTER — TELEPHONE (OUTPATIENT)
Dept: FAMILY MEDICINE CLINIC | Facility: CLINIC | Age: 29
End: 2019-02-13

## 2019-02-13 DIAGNOSIS — M79.601 RIGHT ARM PAIN: Primary | ICD-10-CM

## 2019-02-13 NOTE — TELEPHONE ENCOUNTER
Please call the patient to get more of a history on his symptoms  Unfortunately there is no note from his urgent care visit, so I don't know what their impression was  If he isn't already, I would have him do high dose NSAIDs, I e  Aleve 2 tabs BID x 1 week  Also I could order an Xray and if they thought it was MSK, he would benefit from PT likely  If he's not improving, he would have to be re-evaluated    Thanks

## 2019-02-13 NOTE — TELEPHONE ENCOUNTER
Regarding: FW: Visit Follow-Up Question  Contact: [phone]      ----- Message -----  From: Patricio Wise  Sent: 2/12/2019  11:17 AM  To: [facility]  Subject: Visit Follow-Up Question                         ----- Message from [address] [place] [address] [address], Generic sent at 2/12/2019 11:17 AM EST -----    Dr Clifton Hernandez,  I went to the care now on Friday night because I have had a pain in my right arm that moved up to my shoulder and now today it has moved into my back  It hasnt limited my movement or anything, but it is a sharp pain  The doctor said that their system was down, and they were unable to order an x-ray or anything and he didnt know what was happening  He did give me a muscle relaxer and something else, but it seems to just be hurting more and I was hoping I could get an idea on where to go from here  I am not able to get an appointment to come into the office to speak with you, so I was trying to reach out through the sabrina    Marie Laws

## 2019-02-13 NOTE — TELEPHONE ENCOUNTER
Patient informed, stated that he has pain in right arm going to back when his arm is still and with movement is able to move arm   Is taking muscle relaxer  Agrees to taking Aleve and x-ray  Declined PT   Will call in a week with updated

## 2019-02-27 ENCOUNTER — OFFICE VISIT (OUTPATIENT)
Dept: PULMONOLOGY | Facility: CLINIC | Age: 29
End: 2019-02-27
Payer: COMMERCIAL

## 2019-02-27 VITALS
HEART RATE: 97 BPM | OXYGEN SATURATION: 98 % | WEIGHT: 258 LBS | SYSTOLIC BLOOD PRESSURE: 150 MMHG | TEMPERATURE: 97.7 F | HEIGHT: 71 IN | DIASTOLIC BLOOD PRESSURE: 82 MMHG | BODY MASS INDEX: 36.12 KG/M2

## 2019-02-27 DIAGNOSIS — G47.61 PERIODIC LIMB MOVEMENT SLEEP DISORDER: ICD-10-CM

## 2019-02-27 DIAGNOSIS — J45.40 MODERATE PERSISTENT ASTHMA WITHOUT COMPLICATION: Primary | ICD-10-CM

## 2019-02-27 PROBLEM — Z91.09 ENVIRONMENTAL ALLERGIES: Status: ACTIVE | Noted: 2019-02-27

## 2019-02-27 PROBLEM — R06.02 SHORTNESS OF BREATH: Status: RESOLVED | Noted: 2018-12-14 | Resolved: 2019-02-27

## 2019-02-27 PROCEDURE — 99244 OFF/OP CNSLTJ NEW/EST MOD 40: CPT | Performed by: INTERNAL MEDICINE

## 2019-02-27 RX ORDER — FLUTICASONE FUROATE AND VILANTEROL 100; 25 UG/1; UG/1
1 POWDER RESPIRATORY (INHALATION) DAILY
Qty: 1 INHALER | Refills: 0 | Status: SHIPPED | OUTPATIENT
Start: 2019-02-27 | End: 2019-03-28 | Stop reason: SDUPTHER

## 2019-02-27 RX ORDER — FLUTICASONE FUROATE AND VILANTEROL 100; 25 UG/1; UG/1
1 POWDER RESPIRATORY (INHALATION) DAILY
Qty: 3 INHALER | Refills: 3 | Status: SHIPPED | OUTPATIENT
Start: 2019-03-25 | End: 2019-10-08 | Stop reason: ALTCHOICE

## 2019-02-27 RX ORDER — ALBUTEROL SULFATE 90 UG/1
2 AEROSOL, METERED RESPIRATORY (INHALATION) EVERY 6 HOURS PRN
COMMUNITY
End: 2019-10-08 | Stop reason: ALTCHOICE

## 2019-02-28 NOTE — PROGRESS NOTES
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 [employer]  · Encouraged diet, exercise, and weight loss  · No contribution from GERD or allergic rhinitis per history    Periodic limb movement sleep disorder  · Previous sleep study reviewed  Followed by Dr Noam Pina   · He has had no significant evidence of sleep apnea on prior testing but reports significant weight gain since then  Wife reports significant snoring however  · Presently will defer follow-up until respiratory symptoms under better control  He will consider whether to return to Dr Noam Pina or have repeat polysomnography after asthma symptoms have been addressed    Return for pulmonary follow-up in 2 months  ______________________________________________________________________    HPI:    Millie Medellin is a 33 y o  male who presents for evaluation of asthma  He is a former smoker  He smoked fairly heavily in the past but has quit smoking  Since quitting smoking and changing jobs he reports that he has gained 30 lb or more  He has had increase in shortness of breath and asthma symptoms  He has a rescue inhaler which he uses intermittently  He has had to go to the emergency department on 2 occasions and was given nebulizer treatments which did seem to help  He has not had fever, chills, or infection symptoms  No chest pain or palpitations  He has not had severe respiratory infection or a chronic productive cough      He denies any GERD symptoms although his wife reports significant belching  He does not have significant postnasal drip  He previously was seen by an allergist and had full allergy testing  He apparently has sensitivities to cockroach and dust mites  He does not currently use hypoallergenic pillow covers or mattress covers  He has [id number] carpeting  He does have radiator heat however but has a forced air air conditioning unit  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 and does replace the filters  He currently works as an  for [employer]  He has no other occupational exposures  He did report that when he was significantly less heavy and was more active during the day and was a hip atrial smoker that his respiratory symptoms were actually much better    He did indicate that his record erroneously states that he had a habitual gambling problem  He has never been a habitual gambler his wife confirms that he does not have any gambling issues    Review of Systems:  Review of Systems   Musculoskeletal: Positive for arthralgias (Right shoulder  )  Allergic/Immunologic: Positive for environmental allergies  Neurological: Negative for light-headedness and headaches  Psychiatric/Behavioral: The patient is nervous/anxious  All other systems reviewed and are negative          Historical Information   Past Medical History:   Diagnosis Date    Anxiety     Flu     Rotator cuff tendinitis, left     last assessed 13  documented resolved 14     Past Surgical History:   Procedure Laterality Date    TONSILLECTOMY AND ADENOIDECTOMY      documented resolved     Social History   Social History     Tobacco Use   Smoking Status Former Smoker    Packs/day: 1 00    Years: 10 00    Pack years: 10     Types: Cigarettes    Last attempt to quit: 2018    Years since quittin 1   Smokeless Tobacco Never Used   Tobacco Comment     CURRENTLY USINGVNICOTINE GUM       Occupational history:  As above    Family History:   Family History   Problem Relation Age of Onset    Rheum arthritis Mother     Lung disease Mother         BOOP?? vs ILD    Diabetes Father         DM2    Glaucoma Father     Depression Family     Breast cancer Paternal Grandmother          PhysicalExamination:  Vitals:   /82 (BP Location: Right arm, Patient Position: Sitting)   Pulse 97   Temp 97 7 °F (36 5 °C) (Tympanic)   Ht 5' 11" (1 803 m)   Wt 117 kg (258 lb)   SpO2 98%   BMI 35 98 kg/m²     Gen:  Comfortable on room air and without respiratory distress  HEENT: PERRL  Oropharynx is clear without any erythema or exudate  Neck: Supple  There is no JVD, lymphadenopathy or thyromegaly appreciated  Trachea is midline  Chest: Symmetric chest wall excursion  Lung fields are clear to auscultation  No wheezes, rales or rhonchi  Normal resonance to percussion  Cardiac: Regular rate and rhythm  There are no murmurs  Abdomen: Soft and nontender  Benign  Extremities: No clubbing, cyanosis or edema  Neurologic: No focal deficits  Skin: No appreciable rashes  Diagnostic Data:  Labs:  No recent data pertinent to today's visit    PFT results: The most recent pulmonary function tests were reviewed  Spirometry pre and post bronchodilator was performed January 2019 and showed mild obstruction  FEV1/FVC 68%    FEV1 3 55 L, 77% predicted    Imaging:  I personally reviewed the images on the [facility] system pertinent to today's visit  Chest x-ray performed December 14, 2018 shows clear lung fields bilaterally    Other data:  Polysomnogram 2017 showed no significant obstructive sleep apnea but moderate periodic limb movement sleep disorder    Carlos Galeana MD

## 2019-03-28 DIAGNOSIS — J45.40 MODERATE PERSISTENT ASTHMA WITHOUT COMPLICATION: ICD-10-CM

## 2019-03-28 RX ORDER — FLUTICASONE FUROATE AND VILANTEROL TRIFENATATE 100; 25 UG/1; UG/1
POWDER RESPIRATORY (INHALATION)
Qty: 1 INHALER | Refills: 6 | Status: SHIPPED | OUTPATIENT
Start: 2019-03-28 | End: 2019-04-17 | Stop reason: SDUPTHER

## 2019-04-17 ENCOUNTER — OFFICE VISIT (OUTPATIENT)
Dept: PULMONOLOGY | Facility: CLINIC | Age: 29
End: 2019-04-17
Payer: COMMERCIAL

## 2019-04-17 VITALS
SYSTOLIC BLOOD PRESSURE: 128 MMHG | BODY MASS INDEX: 35.84 KG/M2 | DIASTOLIC BLOOD PRESSURE: 92 MMHG | HEART RATE: 102 BPM | WEIGHT: 256 LBS | RESPIRATION RATE: 18 BRPM | OXYGEN SATURATION: 98 % | HEIGHT: 71 IN | TEMPERATURE: 97.9 F

## 2019-04-17 DIAGNOSIS — G47.33 OSA (OBSTRUCTIVE SLEEP APNEA): ICD-10-CM

## 2019-04-17 DIAGNOSIS — J45.40 MODERATE PERSISTENT ASTHMA WITHOUT COMPLICATION: Primary | ICD-10-CM

## 2019-04-17 PROCEDURE — 99214 OFFICE O/P EST MOD 30 MIN: CPT | Performed by: INTERNAL MEDICINE

## 2019-04-30 ENCOUNTER — TELEPHONE (OUTPATIENT)
Dept: SLEEP CENTER | Facility: CLINIC | Age: 29
End: 2019-04-30

## 2019-05-02 ENCOUNTER — TELEPHONE (OUTPATIENT)
Dept: FAMILY MEDICINE CLINIC | Facility: CLINIC | Age: 29
End: 2019-05-02

## 2019-05-29 ENCOUNTER — OFFICE VISIT (OUTPATIENT)
Dept: FAMILY MEDICINE CLINIC | Facility: CLINIC | Age: 29
End: 2019-05-29
Payer: COMMERCIAL

## 2019-05-29 VITALS
SYSTOLIC BLOOD PRESSURE: 138 MMHG | HEART RATE: 92 BPM | HEIGHT: 72 IN | WEIGHT: 244 LBS | TEMPERATURE: 98 F | DIASTOLIC BLOOD PRESSURE: 94 MMHG | OXYGEN SATURATION: 97 % | BODY MASS INDEX: 33.05 KG/M2 | RESPIRATION RATE: 16 BRPM

## 2019-05-29 DIAGNOSIS — F41.9 ANXIETY: Primary | ICD-10-CM

## 2019-05-29 DIAGNOSIS — F32.2 CURRENT SEVERE EPISODE OF MAJOR DEPRESSIVE DISORDER WITHOUT PSYCHOTIC FEATURES WITHOUT PRIOR EPISODE (HCC): ICD-10-CM

## 2019-05-29 PROCEDURE — 99214 OFFICE O/P EST MOD 30 MIN: CPT | Performed by: FAMILY MEDICINE

## 2019-05-29 PROCEDURE — 1036F TOBACCO NON-USER: CPT | Performed by: FAMILY MEDICINE

## 2019-05-29 PROCEDURE — 3008F BODY MASS INDEX DOCD: CPT | Performed by: FAMILY MEDICINE

## 2019-05-29 RX ORDER — QUETIAPINE FUMARATE 25 MG/1
TABLET, FILM COATED ORAL
Qty: 60 TABLET | Refills: 1 | Status: SHIPPED | OUTPATIENT
Start: 2019-05-29 | End: 2019-06-17 | Stop reason: SDUPTHER

## 2019-05-29 RX ORDER — HYDROXYZINE HYDROCHLORIDE 25 MG/1
25 TABLET, FILM COATED ORAL 2 TIMES DAILY PRN
Qty: 30 TABLET | Refills: 0 | Status: SHIPPED | OUTPATIENT
Start: 2019-05-29 | End: 2019-07-01 | Stop reason: SDUPTHER

## 2019-05-30 ENCOUNTER — OFFICE VISIT (OUTPATIENT)
Dept: BEHAVIORAL/MENTAL HEALTH CLINIC | Facility: CLINIC | Age: 29
End: 2019-05-30
Payer: COMMERCIAL

## 2019-05-30 DIAGNOSIS — F43.23 ADJUSTMENT DISORDER WITH MIXED ANXIETY AND DEPRESSED MOOD: Primary | ICD-10-CM

## 2019-05-30 PROCEDURE — 90834 PSYTX W PT 45 MINUTES: CPT | Performed by: SOCIAL WORKER

## 2019-06-04 ENCOUNTER — OFFICE VISIT (OUTPATIENT)
Dept: BEHAVIORAL/MENTAL HEALTH CLINIC | Facility: CLINIC | Age: 29
End: 2019-06-04
Payer: COMMERCIAL

## 2019-06-04 DIAGNOSIS — F43.23 ADJUSTMENT DISORDER WITH MIXED ANXIETY AND DEPRESSED MOOD: Primary | ICD-10-CM

## 2019-06-04 PROCEDURE — 90834 PSYTX W PT 45 MINUTES: CPT | Performed by: SOCIAL WORKER

## 2019-06-14 ENCOUNTER — OFFICE VISIT (OUTPATIENT)
Dept: BEHAVIORAL/MENTAL HEALTH CLINIC | Facility: CLINIC | Age: 29
End: 2019-06-14
Payer: COMMERCIAL

## 2019-06-14 DIAGNOSIS — F32.2 CURRENT SEVERE EPISODE OF MAJOR DEPRESSIVE DISORDER WITHOUT PSYCHOTIC FEATURES WITHOUT PRIOR EPISODE (HCC): Primary | ICD-10-CM

## 2019-06-14 PROCEDURE — 90834 PSYTX W PT 45 MINUTES: CPT | Performed by: SOCIAL WORKER

## 2019-06-17 ENCOUNTER — TELEPHONE (OUTPATIENT)
Dept: FAMILY MEDICINE CLINIC | Facility: CLINIC | Age: 29
End: 2019-06-17

## 2019-06-17 DIAGNOSIS — F32.2 CURRENT SEVERE EPISODE OF MAJOR DEPRESSIVE DISORDER WITHOUT PSYCHOTIC FEATURES WITHOUT PRIOR EPISODE (HCC): ICD-10-CM

## 2019-06-17 RX ORDER — QUETIAPINE FUMARATE 50 MG/1
75 TABLET, FILM COATED ORAL
Qty: 45 TABLET | Refills: 1 | Status: SHIPPED | OUTPATIENT
Start: 2019-06-17 | End: 2019-08-05 | Stop reason: SDUPTHER

## 2019-06-28 ENCOUNTER — SOCIAL WORK (OUTPATIENT)
Dept: BEHAVIORAL/MENTAL HEALTH CLINIC | Facility: CLINIC | Age: 29
End: 2019-06-28
Payer: COMMERCIAL

## 2019-06-28 DIAGNOSIS — F43.23 ADJUSTMENT DISORDER WITH MIXED ANXIETY AND DEPRESSED MOOD: Primary | ICD-10-CM

## 2019-06-28 PROCEDURE — 90834 PSYTX W PT 45 MINUTES: CPT | Performed by: SOCIAL WORKER

## 2019-06-29 DIAGNOSIS — F34.1 DYSTHYMIA: ICD-10-CM

## 2019-07-01 DIAGNOSIS — F41.9 ANXIETY: ICD-10-CM

## 2019-07-01 RX ORDER — VENLAFAXINE HYDROCHLORIDE 225 MG/1
TABLET, EXTENDED RELEASE ORAL
Qty: 90 TABLET | Refills: 0 | Status: SHIPPED | OUTPATIENT
Start: 2019-07-01 | End: 2019-10-31 | Stop reason: SDUPTHER

## 2019-07-01 RX ORDER — HYDROXYZINE HYDROCHLORIDE 25 MG/1
25 TABLET, FILM COATED ORAL 2 TIMES DAILY PRN
Qty: 30 TABLET | Refills: 0 | Status: SHIPPED | OUTPATIENT
Start: 2019-07-01 | End: 2020-09-02 | Stop reason: ALTCHOICE

## 2019-07-11 ENCOUNTER — SOCIAL WORK (OUTPATIENT)
Dept: BEHAVIORAL/MENTAL HEALTH CLINIC | Facility: CLINIC | Age: 29
End: 2019-07-11
Payer: COMMERCIAL

## 2019-07-11 DIAGNOSIS — F32.2 CURRENT SEVERE EPISODE OF MAJOR DEPRESSIVE DISORDER WITHOUT PSYCHOTIC FEATURES WITHOUT PRIOR EPISODE (HCC): Primary | ICD-10-CM

## 2019-07-11 PROCEDURE — 90834 PSYTX W PT 45 MINUTES: CPT | Performed by: SOCIAL WORKER

## 2019-07-18 ENCOUNTER — SOCIAL WORK (OUTPATIENT)
Dept: BEHAVIORAL/MENTAL HEALTH CLINIC | Facility: CLINIC | Age: 29
End: 2019-07-18
Payer: COMMERCIAL

## 2019-07-18 DIAGNOSIS — F32.2 CURRENT SEVERE EPISODE OF MAJOR DEPRESSIVE DISORDER WITHOUT PSYCHOTIC FEATURES WITHOUT PRIOR EPISODE (HCC): Primary | ICD-10-CM

## 2019-07-18 PROCEDURE — 90834 PSYTX W PT 45 MINUTES: CPT | Performed by: SOCIAL WORKER

## 2019-07-18 NOTE — PATIENT INSTRUCTIONS
Follow up in 2 weeks  Call office or crisis if needed/feels suicidal    Increase social supports and call Divorce support groups provided to patient if desired

## 2019-07-18 NOTE — PSYCH
Assessment/Plan:      Diagnoses and all orders for this visit:    Current severe episode of major depressive disorder without psychotic features without prior episode ([facility] [address] )          Subjective:     Patient ID: April Chung is a 26 y o  male  Pt counseled for 50 minutes from 12:00 to 12:50pm   Met in person with wife at [place]  Met on Tuesday night pt cam home and "wanted to die "  She definitely wants a divorce  Made appointment to see  - his name of her car, her name off house - no claim to house for her  Her Dad was encouraging her to get an   Pt feafful but wife says that she won't  Smoking 2 packs of cigarettes a day  Not when at work  Spending more time with positive friends  29th birthday party was fun but down over divorce - hungover from Friday night with friends  Discussed risk of drinking and smoking as a defense mechanism although pt appears to make ok choices when drinking - puts phone away to not contact wife, etc    Pt sad and feels betrayed about in laws who he considered family - discussed loss and Dad trying to help but not bale to talk emotionally with pt - explored social supports that are helpful, Meet up com groups - engaging in hobbies again - fishing/biking, etc   Pt has crisis line programmed into phone but even at his worst lately, reports not feeling suicidal and feels that he would never harm himself      HPI    Review of Systems      Objective:      Physical Exam  oriented, engaged, calm but sad , full range affect, good eye contact, appropriate speech, denies suicidal/homicidal ideations/psychosis/, fair insight/judgement

## 2019-07-29 ENCOUNTER — SOCIAL WORK (OUTPATIENT)
Dept: BEHAVIORAL/MENTAL HEALTH CLINIC | Facility: CLINIC | Age: 29
End: 2019-07-29
Payer: COMMERCIAL

## 2019-07-29 DIAGNOSIS — F32.2 CURRENT SEVERE EPISODE OF MAJOR DEPRESSIVE DISORDER WITHOUT PSYCHOTIC FEATURES WITHOUT PRIOR EPISODE (HCC): Primary | ICD-10-CM

## 2019-07-29 PROCEDURE — 90834 PSYTX W PT 45 MINUTES: CPT | Performed by: SOCIAL WORKER

## 2019-07-29 NOTE — PSYCH
Assessment/Plan:      Diagnoses and all orders for this visit:    Current severe episode of major depressive disorder without psychotic features without prior episode ([facility] [address] )          Subjective:     Patient ID: Kim Alanis is a 26 y o  male  Pt counseled for 50 minutes from 8:00 - 8:50am   Meeting with  tomorrow but met up with wife yesterday and had a great talk  Met with wife yesterday - great talk - wants to ask her to not file tomorrow if there's hope  PT lost 40lbs now - feels eating improving but appetite still low  Takes Zquil to fall asleep on rough days  Went to baseball game with friends - meeting friends more - discussed ways to increase coping/friendships  Discussed gains emotionally, helpful ways to communicate with others, coping skills, limiting alcohol use and managing challenges -f moving forward with divorce  Also settiing limits with needs, managing guilt      HPI    Review of Systems      Objective:     Physical Exam  oriented, engaged, sad but calm, full range affect, good eye contact, appropriate speech, denies suicidal/homicidal/psychosis, fair insight/judgement

## 2019-08-05 DIAGNOSIS — F32.2 CURRENT SEVERE EPISODE OF MAJOR DEPRESSIVE DISORDER WITHOUT PSYCHOTIC FEATURES WITHOUT PRIOR EPISODE (HCC): ICD-10-CM

## 2019-08-05 RX ORDER — QUETIAPINE FUMARATE 50 MG/1
75 TABLET, FILM COATED ORAL
Qty: 135 TABLET | Refills: 1 | Status: SHIPPED | OUTPATIENT
Start: 2019-08-05 | End: 2019-10-31 | Stop reason: SDUPTHER

## 2019-08-14 ENCOUNTER — SOCIAL WORK (OUTPATIENT)
Dept: BEHAVIORAL/MENTAL HEALTH CLINIC | Facility: CLINIC | Age: 29
End: 2019-08-14
Payer: COMMERCIAL

## 2019-08-14 DIAGNOSIS — F32.2 CURRENT SEVERE EPISODE OF MAJOR DEPRESSIVE DISORDER WITHOUT PSYCHOTIC FEATURES WITHOUT PRIOR EPISODE (HCC): Primary | ICD-10-CM

## 2019-08-14 PROCEDURE — 90834 PSYTX W PT 45 MINUTES: CPT | Performed by: SOCIAL WORKER

## 2019-08-14 NOTE — PSYCH
Assessment/Plan:      Diagnoses and all orders for this visit:    Current severe episode of major depressive disorder without psychotic features without prior episode ([facility] [address] )          Subjective:     Patient ID: Anna Rogers is a 26 y o  male  Pt counseled for 50 minutes from 4:10 - 5:00pm    After last appt, had another 3hr conversation with wife and was great  Then talking/texting for 4 days then silence - pt confronted wife about being "nasty, cold, doesn't give a shit "   6yr anniversary of mom's death on Sunday - friend's bachelor party weekend in [place]   Pt report eating ok, difficulty falling asleep at times but able to sleep  Changed cell phones, car insurance without wife jointly now  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

## 2019-08-23 ENCOUNTER — SOCIAL WORK (OUTPATIENT)
Dept: BEHAVIORAL/MENTAL HEALTH CLINIC | Facility: CLINIC | Age: 29
End: 2019-08-23
Payer: COMMERCIAL

## 2019-08-23 DIAGNOSIS — F32.2 CURRENT SEVERE EPISODE OF MAJOR DEPRESSIVE DISORDER WITHOUT PSYCHOTIC FEATURES WITHOUT PRIOR EPISODE (HCC): Primary | ICD-10-CM

## 2019-08-23 PROCEDURE — 90834 PSYTX W PT 45 MINUTES: CPT | Performed by: SOCIAL WORKER

## 2019-08-23 NOTE — PSYCH
Assessment/Plan:      Diagnoses and all orders for this visit:    Current severe episode of major depressive disorder without psychotic features without prior episode ([facility] [address] )          Subjective:     Patient ID: Jared Kaye is a 26 y o  male  Pt counseled for 50 minutes from 8:00am - 8:50am      Pt has no had contact with wife - feels angry at times - seeing friends more - limiting alcohol use to once on weekends but is getting intoxicated - discussed safety and dangers of doing so  Pt talking to friends, feels Dad unable to support - "not a talker "    Discussed mom's death more- lung issues form RA then complications from surgery - she said no more ventilators  Pt made peace with it - 6yrs ago but feels that his mom could support him mor e    Pt will give wife space and not continue to try to attempt at this point  If she does reach out - discussed laying out plan to fix marriage or ways to cope if she files for divorce  Explored ways to not act out when angry  Pt would like to meet a woman his friends want to set him up with - discussed honesty, not jumping into things due to loneliness  Pt feels coping well - moving into house this week after finishing kitchen - cabinet install today  Reviewed CBT - multiple ways to view situations - combatting cognitive distortions and taking a break when needed vs acting emotionally      HPI    Review of Systems      Objective:     Physical Exam  oriented, engaged, calm but tired - reports late night the night before, full range affect, good eye contact, appropriate speech, denies suicidal/homicidal, fair insight/judgement

## 2019-09-20 ENCOUNTER — SOCIAL WORK (OUTPATIENT)
Dept: BEHAVIORAL/MENTAL HEALTH CLINIC | Facility: CLINIC | Age: 29
End: 2019-09-20
Payer: COMMERCIAL

## 2019-09-20 DIAGNOSIS — F32.2 CURRENT SEVERE EPISODE OF MAJOR DEPRESSIVE DISORDER WITHOUT PSYCHOTIC FEATURES WITHOUT PRIOR EPISODE (HCC): Primary | ICD-10-CM

## 2019-09-20 PROCEDURE — 90834 PSYTX W PT 45 MINUTES: CPT | Performed by: SOCIAL WORKER

## 2019-09-20 NOTE — PSYCH
Assessment/Plan:      Diagnoses and all orders for this visit:    Current severe episode of major depressive disorder without psychotic features without prior episode ([facility] [address] )          Subjective:     Patient ID: Abhishek Domingo is a 26 y o  male  Pt counseled for 50 minutes from 8:00 - 8:50am   Pt filed for divorce - had not heard from wife for weeks  Feels like unable to work on marriage  Sad and worried about how it will turn out - hoping that she doesn't try to go after the house he inherited from his family during separation  Discussed worse case scenariors, how to mathew, looking forward to positive potentials for future  Explored loss/ways to handle - engaging in positive interactions - possibly joining a gym or volunteering  Encouraged pt to remember how he handled mom's death and how bad it felt at first but that he is ok with it over time  Pt discussed how he was not "a good person" as a young adult but feels positive about the person he's become now  Explored strengths and how to utilize to get through difficult times  Suggested pt use lonely times for volunteering or joining a gym, to avoid excessive drinking with friends      HPI    Review of Systems      Objective:     Physical Exam  oriented, engaged, sad, constricted affect, good eye contact, appropriate speech, denies suicidal/homicidal ideations - some passive thoughts - "I don't want to be here" - no intent or plan - would call some one if felt suicidal, good insight/fair judgement

## 2019-10-08 ENCOUNTER — OFFICE VISIT (OUTPATIENT)
Dept: FAMILY MEDICINE CLINIC | Facility: CLINIC | Age: 29
End: 2019-10-08
Payer: COMMERCIAL

## 2019-10-08 VITALS
HEIGHT: 72 IN | HEART RATE: 88 BPM | WEIGHT: 239 LBS | RESPIRATION RATE: 16 BRPM | SYSTOLIC BLOOD PRESSURE: 118 MMHG | TEMPERATURE: 97.8 F | DIASTOLIC BLOOD PRESSURE: 72 MMHG | BODY MASS INDEX: 32.37 KG/M2

## 2019-10-08 DIAGNOSIS — J04.0 ACUTE LARYNGITIS: ICD-10-CM

## 2019-10-08 DIAGNOSIS — J45.40 MODERATE PERSISTENT ASTHMA WITHOUT COMPLICATION: Primary | ICD-10-CM

## 2019-10-08 PROBLEM — Z72.0 TOBACCO USE: Status: ACTIVE | Noted: 2019-10-08

## 2019-10-08 PROCEDURE — 3008F BODY MASS INDEX DOCD: CPT | Performed by: PHYSICIAN ASSISTANT

## 2019-10-08 PROCEDURE — 99214 OFFICE O/P EST MOD 30 MIN: CPT | Performed by: PHYSICIAN ASSISTANT

## 2019-10-08 NOTE — PROGRESS NOTES
Assessment/Plan:     Diagnoses and all orders for this visit:    Moderate persistent asthma without complication  Not currently in exacerbation  Lungs CTA  Patient has albuterol MDI at home has not need it  - Directed to start using albuterol PRN SOB, no refill needed  - Provided sample of mometasone 100 mcg steroid inhaler to be used twice daily  - Directed to FU in 2 weeks  Patient declined to FU  - Directed to go to ED if SOB or wheezing that is unresolved after albuterol usage    Acute laryngitis  Discussed likely viral  - advised to continue OTC supportive care with Tylenol/Motrin and saline gargle   - encouraged rest and fluid intake   - directed to return if fever over 102, symptoms persist for 14 days, thick productive cough          Subjective:    Patient ID: Erik Casey is a 26 y o  male  Pt is presenting today for Sore throat and difficulty swallowing for 7 days  Throat is worse when he woke this morning  No sick contacts at home  No wheezing or difficulty breathing  He has not been using his inhalers and does not feel the need to  He smokes 12 years, 1 PPD  Sore Throat    There has been no fever  Pertinent negatives include no congestion, coughing, diarrhea, ear discharge, ear pain, headaches, hoarse voice, plugged ear sensation, swollen glands, trouble swallowing or vomiting  He has tried nothing for the symptoms  The following portions of the patient's history were reviewed and updated as appropriate: allergies, current medications and problem list     Review of Systems   HENT: Positive for sore throat  Negative for congestion, ear discharge, ear pain, hoarse voice and trouble swallowing  Respiratory: Negative for cough  Gastrointestinal: Negative for diarrhea and vomiting  Neurological: Negative for headaches           Objective:  /72   Pulse 88   Temp 97 8 °F (36 6 °C)   Resp 16   Ht 6' (1 829 m)   Wt 108 kg (239 lb)   BMI 32 41 kg/m²      Physical Exam Constitutional: He is oriented to person, place, and time  He appears well-developed and well-nourished  HENT:   Head: Normocephalic and atraumatic  Right Ear: Tympanic membrane, external ear and ear canal normal    Left Ear: Tympanic membrane, external ear and ear canal normal    Nose: Nose normal  No rhinorrhea  Mouth/Throat: Oropharynx is clear and moist  No oropharyngeal exudate or posterior oropharyngeal erythema  No tonsillar exudate  Cardiovascular: Normal rate, regular rhythm and normal heart sounds  Exam reveals no gallop and no friction rub  No murmur heard  Pulmonary/Chest: Effort normal and breath sounds normal  No respiratory distress  He has no wheezes  He has no rales  Lymphadenopathy:        Head (right side): No submental, no submandibular, no tonsillar, no preauricular and no posterior auricular adenopathy present  Head (left side): No submental, no submandibular, no tonsillar, no preauricular and no posterior auricular adenopathy present  He has no cervical adenopathy  Neurological: He is alert and oriented to person, place, and time  Skin: Skin is warm and dry  Psychiatric: He has a normal mood and affect  His behavior is normal  Thought content normal    Vitals reviewed

## 2019-10-31 DIAGNOSIS — F34.1 DYSTHYMIA: ICD-10-CM

## 2019-10-31 DIAGNOSIS — F32.2 CURRENT SEVERE EPISODE OF MAJOR DEPRESSIVE DISORDER WITHOUT PSYCHOTIC FEATURES WITHOUT PRIOR EPISODE (HCC): ICD-10-CM

## 2019-10-31 RX ORDER — QUETIAPINE FUMARATE 50 MG/1
75 TABLET, FILM COATED ORAL
Qty: 135 TABLET | Refills: 1 | Status: SHIPPED | OUTPATIENT
Start: 2019-10-31 | End: 2020-02-18 | Stop reason: SDUPTHER

## 2019-10-31 RX ORDER — VENLAFAXINE HYDROCHLORIDE 225 MG/1
225 TABLET, EXTENDED RELEASE ORAL DAILY
Qty: 90 TABLET | Refills: 1 | Status: SHIPPED | OUTPATIENT
Start: 2019-10-31 | End: 2020-02-24 | Stop reason: SDUPTHER

## 2019-11-27 DIAGNOSIS — Z71.6 ENCOUNTER FOR SMOKING CESSATION COUNSELING: Primary | ICD-10-CM

## 2019-11-27 RX ORDER — NICOTINE 21 MG/24HR
1 PATCH, TRANSDERMAL 24 HOURS TRANSDERMAL EVERY 24 HOURS
Qty: 28 PATCH | Refills: 0 | Status: SHIPPED | OUTPATIENT
Start: 2019-11-27 | End: 2020-04-23

## 2019-11-27 RX ORDER — NICOTINE 21 MG/24HR
1 PATCH, TRANSDERMAL 24 HOURS TRANSDERMAL EVERY 24 HOURS
COMMUNITY
End: 2019-11-27 | Stop reason: SDUPTHER

## 2019-11-27 NOTE — TELEPHONE ENCOUNTER
Spoke to patient he smokes 1 pack per day  Rx sent to Dr Elin Bianchi for 14 mg patch ----- Message from [facility]  Elin Bianchi MD sent at 11/27/2019  7:56 AM EST -----  Regarding: FW: Prescription Question  Contact: [phone]  Call, ask how many cigarettes/day he is currently smoking and place rx request accordingly, (7mg patch for 0 5ppd, 14mg for 1ppd and 21mg for 2 ppd)  thanks  ----- Message -----  From: Sean Bamberger  Sent: 11/26/2019   4:52 PM EST  To: [facility]  Elin Bianchi MD  Subject: Judith Cabrera: Prescription Question                            ----- Message -----  From: Lars Kee  Sent: 11/26/2019   4:43 PM EST  To: [facility] Clinical  Subject: Prescription Question                            Dr Elin Bianchi,    I wanted to see if you could call me in a prescription for nicotine patches to [facility] in [place] please  Thank you!     Capri Stephenson

## 2020-02-18 DIAGNOSIS — F32.2 CURRENT SEVERE EPISODE OF MAJOR DEPRESSIVE DISORDER WITHOUT PSYCHOTIC FEATURES WITHOUT PRIOR EPISODE (HCC): ICD-10-CM

## 2020-02-18 RX ORDER — QUETIAPINE FUMARATE 50 MG/1
75 TABLET, FILM COATED ORAL
Qty: 135 TABLET | Refills: 0 | Status: SHIPPED | OUTPATIENT
Start: 2020-02-18 | End: 2020-06-16

## 2020-02-18 NOTE — TELEPHONE ENCOUNTER
pATIENT INFORMED, MADE APPOINTMENT FOR 3/19/2020----- Message from Nuzhat Floyd MD sent at 2/18/2020 11:08 AM EST -----  Regarding: FW: RE: Prescription Question  Contact: [phone]  Continue seroquel for now, needs an appt for med check to review if he will be on it long term or not, last seen may 2019    ----- Message -----  From: Lorraine Rojas  Sent: 2/18/2020  10:40 AM EST  To: Nuzhat Floyd MD  Subject: Emogene Herd: RE: Prescription Question                        ----- Message -----  From: Jailene Ríos  Sent: 2/18/2020  10:30 AM EST  To: [address] [place] [address] Clinical  Subject: RE: RE: Prescription Question                    Marilyn Barnard,    I just called them and they said if you use fax it is [phone] and if it's E scribe the address is:    [address] [place] [address]    Thank you  Cierra Kee    ----- Message -----  From: Lorraine Rojas  Sent: 2/18/20, 10:17 AM  To: Jailene Ríos  Subject: RE: Prescription Question    Good Morning Cierra Kee,  What is the address for your new mail order, we have several [facility] in our system  Thank You,  Rodri Rudd LPN    ----- Message -----     From: Jailene Ríos     Sent: 2/18/2020  8:57 AM EST       To: Nuzhat Floyd MD  Subject: Prescription Question    Good morning Dr Mario Folyd,    I was advised to let the office know that my company changed its mail order prescriptions and I was hoping I could have the [facility] removed and changed to [facility]  Also the medication that I take at night, I believe it is Seroquel, is that going to be a medication that I will take for a lot longer or do I eventually come off of it? I need a refill of it sent to the new mail order service please   Thank you      Crystal Bradne

## 2020-02-24 DIAGNOSIS — F34.1 DYSTHYMIA: ICD-10-CM

## 2020-02-24 RX ORDER — VENLAFAXINE HYDROCHLORIDE 225 MG/1
225 TABLET, EXTENDED RELEASE ORAL DAILY
Qty: 90 TABLET | Refills: 1 | Status: SHIPPED | OUTPATIENT
Start: 2020-02-24 | End: 2020-09-21

## 2020-04-16 ENCOUNTER — TELEPHONE (OUTPATIENT)
Dept: FAMILY MEDICINE CLINIC | Facility: CLINIC | Age: 30
End: 2020-04-16

## 2020-04-22 ENCOUNTER — CLINICAL SUPPORT (OUTPATIENT)
Dept: FAMILY MEDICINE CLINIC | Facility: CLINIC | Age: 30
End: 2020-04-22

## 2020-04-22 DIAGNOSIS — Z72.0 TOBACCO USE: Primary | ICD-10-CM

## 2020-04-23 ENCOUNTER — CLINICAL SUPPORT (OUTPATIENT)
Dept: FAMILY MEDICINE CLINIC | Facility: CLINIC | Age: 30
End: 2020-04-23

## 2020-04-23 DIAGNOSIS — Z72.0 TOBACCO USE: Primary | ICD-10-CM

## 2020-04-23 DIAGNOSIS — F32.2 CURRENT SEVERE EPISODE OF MAJOR DEPRESSIVE DISORDER WITHOUT PSYCHOTIC FEATURES WITHOUT PRIOR EPISODE (HCC): ICD-10-CM

## 2020-04-23 RX ORDER — VARENICLINE TARTRATE 25 MG
KIT ORAL
Qty: 53 TABLET | Refills: 0 | Status: SHIPPED | OUTPATIENT
Start: 2020-04-23 | End: 2020-05-18

## 2020-04-30 ENCOUNTER — CLINICAL SUPPORT (OUTPATIENT)
Dept: FAMILY MEDICINE CLINIC | Facility: CLINIC | Age: 30
End: 2020-04-30

## 2020-04-30 DIAGNOSIS — Z72.0 TOBACCO USE: Primary | ICD-10-CM

## 2020-05-07 ENCOUNTER — CLINICAL SUPPORT (OUTPATIENT)
Dept: FAMILY MEDICINE CLINIC | Facility: CLINIC | Age: 30
End: 2020-05-07

## 2020-05-07 DIAGNOSIS — Z72.0 TOBACCO USE: Primary | ICD-10-CM

## 2020-05-18 ENCOUNTER — TELEPHONE (OUTPATIENT)
Dept: FAMILY MEDICINE CLINIC | Facility: CLINIC | Age: 30
End: 2020-05-18

## 2020-05-18 DIAGNOSIS — Z72.0 TOBACCO USE: Primary | ICD-10-CM

## 2020-05-18 RX ORDER — VARENICLINE TARTRATE 1 MG/1
1 TABLET, FILM COATED ORAL 2 TIMES DAILY
Qty: 60 TABLET | Refills: 0 | Status: SHIPPED | OUTPATIENT
Start: 2020-05-18 | End: 2020-06-15 | Stop reason: SDUPTHER

## 2020-06-03 ENCOUNTER — CLINICAL SUPPORT (OUTPATIENT)
Dept: FAMILY MEDICINE CLINIC | Facility: CLINIC | Age: 30
End: 2020-06-03

## 2020-06-03 DIAGNOSIS — Z72.0 TOBACCO USE: Primary | ICD-10-CM

## 2020-06-15 ENCOUNTER — TELEPHONE (OUTPATIENT)
Dept: FAMILY MEDICINE CLINIC | Facility: CLINIC | Age: 30
End: 2020-06-15

## 2020-06-15 DIAGNOSIS — Z72.0 TOBACCO USE: ICD-10-CM

## 2020-06-15 RX ORDER — VARENICLINE TARTRATE 1 MG/1
1 TABLET, FILM COATED ORAL 2 TIMES DAILY
Qty: 60 TABLET | Refills: 0 | Status: SHIPPED | OUTPATIENT
Start: 2020-06-15 | End: 2020-07-17 | Stop reason: SDUPTHER

## 2020-06-16 DIAGNOSIS — F32.2 CURRENT SEVERE EPISODE OF MAJOR DEPRESSIVE DISORDER WITHOUT PSYCHOTIC FEATURES WITHOUT PRIOR EPISODE (HCC): ICD-10-CM

## 2020-06-16 RX ORDER — QUETIAPINE FUMARATE 50 MG/1
50 TABLET, FILM COATED ORAL
Qty: 90 TABLET | Refills: 0 | Status: SHIPPED | OUTPATIENT
Start: 2020-06-16 | End: 2021-01-25 | Stop reason: ALTCHOICE

## 2020-06-30 ENCOUNTER — CLINICAL SUPPORT (OUTPATIENT)
Dept: FAMILY MEDICINE CLINIC | Facility: CLINIC | Age: 30
End: 2020-06-30

## 2020-06-30 DIAGNOSIS — Z72.0 TOBACCO USE: Primary | ICD-10-CM

## 2020-06-30 NOTE — PROGRESS NOTES
chatnix bid, dreams, no nausea, keep going for more months  Breathing still sucks, out of breath 10 seconds in to exercise  Inhalers: has issues with maintenacne versus rescue adherence cost  pfts overdue

## 2020-07-03 NOTE — PROGRESS NOTES
Smoking cessation plan   Jovan Alcazar, Pharm  D  Clinical Integration Pharmacist    · Quit Date: Monday, May 4th  · Approx  2 month into being quit  · Using Chantix 1mg BID plus NRT 2mg gum PRN  · No unpleasant AEs reported  · Can do up to 12 weeks of therapy and even after that an additional 12 weeks may be beneficial (12 weeks approx  Through end of July, another 12 weeks through end of October) - patient leaning towards staying on it through end of October  · No cigarettes since last check in  · Overall very encouraged by resilience and remaining quit  · Will check in after PCP appt  · Discussed inhaler use and PFTs  · Patient stated he used to get confused on which was maintenance and which was rescue/how to use/etc plus cost  · Consider redoing PFTs and starting maintenance inhalers again since patient experiencing SOB "10 seconds" into exercise despite being quit for several months    Demographics  Interaction Method: Phone  Type of Intervention: Follow-Up    Topic(s) Addressed  Other    Intervention(s) Made      Non-Pharmacologic:  Adherence Addressed    Other    Tool(s) Used  Not Applicable    Time Spent:   Time Spent in Direct Patient Care: 10 minutes    Time Spent in Care Coordination: 5 minutes    Recommendation(s) Accepted by the Patient/Caregiver:  All Accepted

## 2020-07-10 ENCOUNTER — TELEPHONE (OUTPATIENT)
Dept: FAMILY MEDICINE CLINIC | Facility: CLINIC | Age: 30
End: 2020-07-10

## 2020-07-10 NOTE — TELEPHONE ENCOUNTER
Called patient to reschedule his evening appointment with you for 7/16/2020  Patient was very upset that his appointment was being rescheduled again  I told him he could keep the same day and time but it would be with Jones Pickard  He said no he only wanted to see you  I explained that you no longer have evening hours  I offered him a first appointment of the day and a last appointment of the day  He was still not happy  I again explained that Jones Pickard had evening appointments  He said to cancel his appointment

## 2020-07-13 NOTE — TELEPHONE ENCOUNTER
Pt will need an appt before any additional med refills, has been >1 year since visit, can also offer virtual

## 2020-07-17 DIAGNOSIS — Z72.0 TOBACCO USE: ICD-10-CM

## 2020-07-17 RX ORDER — VARENICLINE TARTRATE 1 MG/1
1 TABLET, FILM COATED ORAL 2 TIMES DAILY
Qty: 60 TABLET | Refills: 1 | Status: SHIPPED | OUTPATIENT
Start: 2020-07-17 | End: 2020-09-02 | Stop reason: ALTCHOICE

## 2020-07-29 ENCOUNTER — CLINICAL SUPPORT (OUTPATIENT)
Dept: FAMILY MEDICINE CLINIC | Facility: CLINIC | Age: 30
End: 2020-07-29

## 2020-07-29 DIAGNOSIS — Z72.0 TOBACCO USE: Primary | ICD-10-CM

## 2020-07-29 NOTE — PROGRESS NOTES
Smoking cessation plan   Minesh Feliz, Pharm  D  Clinical Integration Pharmacist    · Quit Date: Monday, May 4th  · Approx  3 months into being quit  · Using Chantix 1mg BID plus NRT 2mg gum PRN  · No unpleasant AEs reported  · Can do up to 12 weeks of therapy and even after that an additional 12 weeks may be beneficial (12 weeks approx  Through end of July, another 12 weeks through end of October) - patient leaning towards staying on it through end of October  · No cigarettes since last check in  · Overall very encouraged by resilience and remaining quit  · Will check in after PCP appt -- NEEDS to R/S (has RX Chantix through mid September)  · Discussed inhaler use and PFTs  · Patient stated he used to get confused on which was maintenance and which was rescue/how to use/etc plus cost  · Consider redoing PFTs and starting maintenance inhalers again since patient experiencing SOB "10 seconds" into exercise despite being quit for several months  Demographics  Interaction Method: Phone  Type of Intervention: Follow-Up    Topic(s) Addressed  Other    Intervention(s) Made      Non-Pharmacologic:  Adherence Addressed    Preventive Care Gap Closure Recommendation    Other    Tool(s) Used  Not Applicable    Time Spent:   Time Spent in Direct Patient Care: 10 minutes    Time Spent in Care Coordination: 10 minutes    Recommendation(s) Accepted by the Patient/Caregiver:  All Accepted

## 2020-09-02 ENCOUNTER — OFFICE VISIT (OUTPATIENT)
Dept: FAMILY MEDICINE CLINIC | Facility: CLINIC | Age: 30
End: 2020-09-02
Payer: COMMERCIAL

## 2020-09-02 VITALS
RESPIRATION RATE: 18 BRPM | DIASTOLIC BLOOD PRESSURE: 78 MMHG | SYSTOLIC BLOOD PRESSURE: 122 MMHG | WEIGHT: 256 LBS | HEART RATE: 84 BPM | TEMPERATURE: 98.9 F | BODY MASS INDEX: 35.84 KG/M2 | HEIGHT: 71 IN | OXYGEN SATURATION: 96 %

## 2020-09-02 DIAGNOSIS — F41.9 ANXIETY: ICD-10-CM

## 2020-09-02 DIAGNOSIS — M25.511 ACUTE PAIN OF RIGHT SHOULDER: ICD-10-CM

## 2020-09-02 DIAGNOSIS — Z00.00 ROUTINE ADULT HEALTH MAINTENANCE: Primary | ICD-10-CM

## 2020-09-02 DIAGNOSIS — Z72.0 TOBACCO USE: ICD-10-CM

## 2020-09-02 DIAGNOSIS — F32.2 CURRENT SEVERE EPISODE OF MAJOR DEPRESSIVE DISORDER WITHOUT PSYCHOTIC FEATURES WITHOUT PRIOR EPISODE (HCC): ICD-10-CM

## 2020-09-02 DIAGNOSIS — J45.40 MODERATE PERSISTENT ASTHMA WITHOUT COMPLICATION: ICD-10-CM

## 2020-09-02 PROCEDURE — 1036F TOBACCO NON-USER: CPT | Performed by: FAMILY MEDICINE

## 2020-09-02 PROCEDURE — 3725F SCREEN DEPRESSION PERFORMED: CPT | Performed by: FAMILY MEDICINE

## 2020-09-02 PROCEDURE — 99395 PREV VISIT EST AGE 18-39: CPT | Performed by: FAMILY MEDICINE

## 2020-09-02 RX ORDER — NAPROXEN 500 MG/1
500 TABLET ORAL 2 TIMES DAILY WITH MEALS
Qty: 20 TABLET | Refills: 0 | Status: SHIPPED | OUTPATIENT
Start: 2020-09-02 | End: 2021-01-25 | Stop reason: ALTCHOICE

## 2020-09-02 RX ORDER — ALBUTEROL SULFATE 90 UG/1
2 AEROSOL, METERED RESPIRATORY (INHALATION) EVERY 6 HOURS PRN
Qty: 1 INHALER | Refills: 5 | Status: SHIPPED | OUTPATIENT
Start: 2020-09-02

## 2020-09-02 NOTE — PROGRESS NOTES
HEALTH MAINTENANCE  Negin Horn 31 y o  male   DATE: September 2, 2020   Assessment and Plan:  31 y o  male exam      1  Health Maintenance  - Labs: Pt declined testing due to cost at this time  - Immunizations: Reviewed  Recommend yearly flu vaccine  2  Other diagnoses addressed today:   Problem List Items Addressed This Visit        Respiratory    Moderate persistent asthma without complication     Mostly exercise induced, patient could not afford ICS, will re-prescribe Albuterol PRN         Relevant Medications    albuterol (PROVENTIL HFA,VENTOLIN HFA) 90 mcg/act inhaler       Other    Anxiety     Continue Venlafaxine 225mg daily, will do trial off of Seroquel 50mg qHS, reviewed titration instructions         Current severe episode of major depressive disorder without psychotic features without prior episode ([facility] [address] )    Tobacco use      Other Visit Diagnoses     Routine adult health maintenance    -  Primary    Acute pain of right shoulder        Likely MSK, trial of PO NSAID x 1 week, then PRN  Will check Xray only if symptoms persist due to cost concern    Relevant Medications    naproxen (NAPROSYN) 500 mg tablet        Immunizations and preventive care screenings were discussed with patient today  Appropriate education was printed on patient's after visit summary  Counseling:  · Alcohol/drug use: discussed moderation in alcohol intake, the recommendations for healthy alcohol use, and avoidance of illicit drug use  · Dental Health: discussed importance of regular tooth brushing, flossing, and dental visits  · Injury prevention: discussed safety/seat belts, safety helmets, smoke detectors, carbon dioxide detectors, and smoking near bedding or upholstery  · Exercise: the importance of regular exercise/physical activity was discussed  Recommend exercise 3-5 times per week for at least 30 minutes  BMI Counseling: Body mass index is 35 45 kg/m²   The BMI is above normal  Nutrition recommendations include encouraging healthy choices of fruits and vegetables  RTC 1 year for annual  visit or sooner PRN    Subjective:    Ed Mkai is a 31 y o  male and is here for his comprehensive physical exam      Acute complaints: none  Patient has known major depressive disorder for which he was previously seeing tele psychiatry, currently maintained on venlafaxine 225 mg daily and quetiapine 50 mg daily, he is angry regardless of the seroquel, but it did help with his depression  His divorce is finally complete    Patient also history of tobacco abuse, quit smoking with Chantix in May 2020, stopped Chantix about 3 weeks ago, still uses nicotine gum PRN      Diet and Physical Activity  · Diet/Nutrition: does not follow a well balanced diet  · Exercise: no formal exercise  General Health  · Vision: no vision problems and goes for regular eye exams  · Dental: regular dental visits          Histories Updated and Reviewed 9/2/2020:  Patient's Medications   New Prescriptions    ALBUTEROL (PROVENTIL HFA,VENTOLIN HFA) 90 MCG/ACT INHALER    Inhale 2 puffs every 6 (six) hours as needed for wheezing or shortness of breath    NAPROXEN (NAPROSYN) 500 MG TABLET    Take 1 tablet (500 mg total) by mouth 2 (two) times a day with meals for 10 days   Previous Medications    QUETIAPINE (SEROQUEL) 50 MG TABLET    Take 1 tablet (50 mg total) by mouth daily at bedtime    VENLAFAXINE 225 MG TB24    Take 1 tablet (225 mg total) by mouth daily   Modified Medications    No medications on file   Discontinued Medications    HYDROXYZINE HCL (ATARAX) 25 MG TABLET    TAKE 1 TABLET (25 MG TOTAL) BY MOUTH 2 (TWO) TIMES A DAY AS NEEDED FOR ANXIETY    VARENICLINE (CHANTIX) 1 MG TABLET    Take 1 tablet (1 mg total) by mouth 2 (two) times a day     No Known Allergies  Past Medical History:   Diagnosis Date    Anxiety     Flu     Rotator cuff tendinitis, left     last assessed 11/4/13  documented resolved 9/12/14     Social History Socioeconomic History    Marital status: /Civil Union     Spouse name: Not on file    Number of children: Not on file    Years of education: Not on file    Highest education level: Not on file   Occupational History    Not on file   Social Needs    Financial resource strain: Not on file    Food insecurity     Worry: Not on file     Inability: Not on file    Transportation needs     Medical: Not on file     Non-medical: Not on file   Tobacco Use    Smoking status: Former Smoker     Packs/day: 1 00     Years: 10      Pack years: 10      Types: Cigarettes     Last attempt to quit: 2020     Years since quittin 3    Smokeless tobacco: Never Used   Substance and Sexual Activity    Alcohol use: Yes     Frequency: 2-3 times a week     Drinks per session: 1 or 2     Binge frequency: Never     Comment: SOCIAL    Drug use: No    Sexual activity: Not on file   Lifestyle    Physical activity     Days per week: Not on file     Minutes per session: Not on file    Stress: Not on file   Relationships    Social connections     Talks on phone: Not on file     Gets together: Not on file     Attends Voodoo service: Not on file     Active member of club or organization: Not on file     Attends meetings of clubs or organizations: Not on file     Relationship status: Not on file    Intimate partner violence     Fear of current or ex partner: Not on file     Emotionally abused: Not on file     Physically abused: Not on file     Forced sexual activity: Not on file   Other Topics Concern    Not on file   Social History Narrative    Not on file     Immunization History   Administered Date(s) Administered    INFLUENZA 10/05/2018, 10/30/2018    Influenza TIV (IM) 2010, 2012       PHQ-9 Depression Screening    PHQ-9:    Frequency of the following problems over the past two weeks:       Little interest or pleasure in doing things:  0 - not at all  Feeling down, depressed, or hopeless:  0 - not at all  PHQ-2 Score:  0         Objective:  /78 (BP Location: Left arm, Patient Position: Sitting, Cuff Size: Large)   Pulse 84   Temp 98 9 °F (37 2 °C)   Resp 18   Ht 5' 11 25" (1 81 m)   Wt 116 kg (256 lb)   SpO2 96%   BMI 35 45 kg/m²   Physical Exam  Constitutional:       General: He is not in acute distress  Appearance: He is well-developed  He is obese  He is not diaphoretic  HENT:      Head: Normocephalic and atraumatic  Eyes:      General:         Right eye: No discharge  Left eye: No discharge  Conjunctiva/sclera: Conjunctivae normal       Pupils: Pupils are equal, round, and reactive to light  Neck:      Musculoskeletal: Normal range of motion and neck supple  Cardiovascular:      Rate and Rhythm: Normal rate and regular rhythm  Pulmonary:      Effort: Pulmonary effort is normal  No respiratory distress  Breath sounds: Normal breath sounds  No wheezing  Abdominal:      General: Bowel sounds are normal       Palpations: Abdomen is soft  Musculoskeletal: Normal range of motion  Neurological:      Mental Status: He is alert  Patient Care Team:  Denver Kitten Obed Akin, MD as PCP - General (Family Medicine)    Denver Kitten Obed Akin, MD    Note: Portions of the record may have been created with voice recognition software  Occasional wrong word or "sound a like" substitutions may have occurred due to the inherent limitations of voice recognition software  Read the chart carefully and recognize, using context, where substitutions have occurred

## 2020-09-02 NOTE — ASSESSMENT & PLAN NOTE
Continue Venlafaxine 225mg daily, will do trial off of Seroquel 50mg qHS, reviewed titration instructions

## 2020-09-19 DIAGNOSIS — F34.1 DYSTHYMIA: ICD-10-CM

## 2020-09-20 DIAGNOSIS — F34.1 DYSTHYMIA: ICD-10-CM

## 2020-09-21 DIAGNOSIS — F34.1 DYSTHYMIA: ICD-10-CM

## 2020-09-21 RX ORDER — VENLAFAXINE HYDROCHLORIDE 225 MG/1
TABLET, EXTENDED RELEASE ORAL
Qty: 90 TABLET | Refills: 1 | OUTPATIENT
Start: 2020-09-21

## 2020-09-21 RX ORDER — VENLAFAXINE HYDROCHLORIDE 225 MG/1
225 TABLET, EXTENDED RELEASE ORAL DAILY
Qty: 90 TABLET | Refills: 1 | Status: SHIPPED | OUTPATIENT
Start: 2020-09-21 | End: 2021-03-08 | Stop reason: SDUPTHER

## 2020-09-21 RX ORDER — VENLAFAXINE HYDROCHLORIDE 225 MG/1
225 TABLET, EXTENDED RELEASE ORAL DAILY
Qty: 14 TABLET | Refills: 0 | Status: SHIPPED | OUTPATIENT
Start: 2020-09-21 | End: 2020-09-21 | Stop reason: SDUPTHER

## 2020-09-21 RX ORDER — VENLAFAXINE HYDROCHLORIDE 225 MG/1
TABLET, EXTENDED RELEASE ORAL
Qty: 90 TABLET | Refills: 1 | Status: SHIPPED | OUTPATIENT
Start: 2020-09-21 | End: 2020-09-21 | Stop reason: SDUPTHER

## 2020-09-21 NOTE — TELEPHONE ENCOUNTER
----- Message from [place] Springs[place] Chávez sent at 9/19/2020  9:20 AM EDT -----  Regarding: Prescription Question  Contact: [phone]  Dr Kizzy Chau,    I just noticed that I need a refill for the venlafaxine and my last pill will be this Friday  It looks like the mail order [facility] needs a new script so I think they sent one over if you could please send them one in  I don't know how long it takes but if you think it would take longer than a week could you please send a small script to [facility] in [place] to hold me over until then  Also those pills for my shoulder seemed to make the pain much better but the numbness still comes and goes  And I'm trying the inhaler to see if that helps me  Thank you!      Sumaya Mosley

## 2021-01-22 ENCOUNTER — NURSE TRIAGE (OUTPATIENT)
Dept: OTHER | Facility: OTHER | Age: 31
End: 2021-01-22

## 2021-01-22 DIAGNOSIS — Z11.9 ENCOUNTER FOR SCREENING FOR INFECTIOUS AND PARASITIC DISEASES, UNSPECIFIED: Primary | ICD-10-CM

## 2021-01-23 NOTE — TELEPHONE ENCOUNTER
Regarding: COVID - Exposure (1 of 2, [place] and [place])  ----- Message from Doris Dejesus sent at 1/22/2021  9:17 PM EST -----  "My girlfriend tested positive for COVID  I have come in close contact and would like to be tested  I have no symptoms  "

## 2021-01-23 NOTE — TELEPHONE ENCOUNTER
Pt's girlfriend tested positive today for COVID  Girlfriend lives with pt and her symptoms started on Wednesday  Virtual visit scheduled for 1/25  Pt is requesting a covid test  Order placed  Advised pt to wait until Sunday to go for testing due to exposure  Pt informed of closest testing site and was advised of hours of operation, address, to wear a mask, and to stay in the car   Pt verbalized understanding       Pt already has a [facility] account to check for results   Advised to begin checking in 2-3 days after testing is completed  Reason for Disposition   [1] CLOSE CONTACT COVID-19 EXPOSURE within last 14 days AND [2] NO symptoms    Answer Assessment - Initial Assessment Questions  Were you within 6 feet or less, for up to 15 minutes or more with a person that has a confirmed COVID-19 test? Yes     What was the date of your exposure?  Lives with     Are you experiencing any symptoms attributed to the virus?  (Assess for SOB, cough, fever, difficulty breathing) Denies     HIGH RISK: Do you have any history heart or lung conditions, weakened immune system, diabetes, Asthma, CHF, HIV, COPD, Chemo, renal failure, sickle cell, etc? Smoker    Protocols used: CORONAVIRUS (COVID-19) EXPOSURE-ADULT-

## 2021-01-24 DIAGNOSIS — Z11.9 ENCOUNTER FOR SCREENING FOR INFECTIOUS AND PARASITIC DISEASES, UNSPECIFIED: ICD-10-CM

## 2021-01-24 PROCEDURE — U0003 INFECTIOUS AGENT DETECTION BY NUCLEIC ACID (DNA OR RNA); SEVERE ACUTE RESPIRATORY SYNDROME CORONAVIRUS 2 (SARS-COV-2) (CORONAVIRUS DISEASE [COVID-19]), AMPLIFIED PROBE TECHNIQUE, MAKING USE OF HIGH THROUGHPUT TECHNOLOGIES AS DESCRIBED BY CMS-2020-01-R: HCPCS | Performed by: FAMILY MEDICINE

## 2021-01-24 PROCEDURE — U0005 INFEC AGEN DETEC AMPLI PROBE: HCPCS | Performed by: FAMILY MEDICINE

## 2021-01-25 ENCOUNTER — TELEMEDICINE (OUTPATIENT)
Dept: FAMILY MEDICINE CLINIC | Facility: CLINIC | Age: 31
End: 2021-01-25
Payer: COMMERCIAL

## 2021-01-25 DIAGNOSIS — Z20.822 EXPOSURE TO COVID-19 VIRUS: Primary | ICD-10-CM

## 2021-01-25 DIAGNOSIS — F41.9 ANXIETY: ICD-10-CM

## 2021-01-25 LAB — SARS-COV-2 RNA RESP QL NAA+PROBE: NEGATIVE

## 2021-01-25 PROCEDURE — 99213 OFFICE O/P EST LOW 20 MIN: CPT | Performed by: FAMILY MEDICINE

## 2021-01-25 RX ORDER — HYDROXYZINE HYDROCHLORIDE 25 MG/1
25 TABLET, FILM COATED ORAL EVERY 6 HOURS PRN
Qty: 30 TABLET | Refills: 0 | Status: SHIPPED | OUTPATIENT
Start: 2021-01-25

## 2021-01-25 NOTE — PROGRESS NOTES
COVID-19 Virtual Visit     Assessment/Plan:    Problem List Items Addressed This Visit        Other    Anxiety    Relevant Medications    hydrOXYzine HCL (ATARAX) 25 mg tablet      Other Visit Diagnoses     Exposure to COVID-19 virus    -  Primary         Disposition:     I recommended self-quarantine for 10 days and to watch for symptoms until 14 days after exposure  If patient were to develop symptoms, they should self isolate and call our office for further guidance  I recommended COVID-19 PCR testing on or after day 5 since last exposure and if negative can end quarantine after 7 days  Patient was instructed to watch for symptoms until 14 days after exposure  If patient were to develop symptoms, they should immediately self isolate and call our office for further guidance  Patient with pending test result  Reviewed guidelines for quarantine in various scenarios of positive without symptoms, positive with development of symptoms, negative and no symptoms, ect  I have spent 10 minutes directly with the patient  Greater than 50% of this time was spent in counseling/coordination of care regarding: patient and family education and impressions  Encounter provider Bronwyn Denver, MD    Provider located at [place] [address]  [phone]    Recent Visits  No visits were found meeting these conditions  Showing recent visits within past 7 days and meeting all other requirements     Today's Visits  Date Type Provider Dept   01/25/21 Telemedicine Bronwyn Denver, MD [facility]   Showing today's visits and meeting all other requirements     Future Appointments  No visits were found meeting these conditions  Showing future appointments within next 150 days and meeting all other requirements      This virtual check-in was done via [employer] and patient was informed that this is a secure, HIPAA-compliant platform   He agrees to proceed  Patient agrees to participate in a virtual check in via telephone or video visit instead of presenting to the office to address urgent/immediate medical needs  Patient is aware this is a billable service  After connecting through [facility], the patient was identified by name and date of birth  Steven Lopez was informed that this was a telemedicine visit and that the exam was being conducted confidentially over secure lines  My office door was closed  No one else was in the room  Steven Lopez acknowledged consent and understanding of privacy and security of the telemedicine visit  I informed the patient that I have reviewed his record in Epic and presented the opportunity for him to ask any questions regarding the visit today  The patient agreed to participate  Subjective:   Steven Lopez is a 31 y o  male who is concerned about COVID-19  Patient is currently asymptomatic  Patient denies fever, chills, fatigue, malaise, congestion, rhinorrhea, sore throat, anosmia, loss of taste, cough, shortness of breath, chest tightness, abdominal pain, nausea, vomiting, diarrhea, myalgias and headaches  Exposure:   Contact with a person who is under investigation (PUI) for or who is positive for COVID-19 within the last 14 days?: Yes    Hospitalized recently for fever and/or lower respiratory symptoms?: No      Currently a healthcare worker that is involved in direct patient care?: No      Works in a special setting where the risk of COVID-19 transmission may be high? (this may include long-term care, correctional and skilled nursing facilities; homeless shelters; assisted-living facilities and group homes ): No      Resident in a special setting where the risk of COVID-19 transmission may be high? (this may include long-term care, correctional and skilled nursing facilities; homeless shelters; assisted-living facilities and group homes ): No      His girlfriend tested positive COVID Friday January 22   She has been isolating herself to the bedroom  She is a hairdresser, but has been very cautious  No results found for: PALAKV2, [address], [address]  Past Medical History:   Diagnosis Date    Anxiety     Flu     Rotator cuff tendinitis, left     last assessed 11/4/13  documented resolved 9/12/14     Past Surgical History:   Procedure Laterality Date    TONSILLECTOMY AND ADENOIDECTOMY      documented resolved     Current Outpatient Medications   Medication Sig Dispense Refill    albuterol (PROVENTIL HFA,VENTOLIN HFA) 90 mcg/act inhaler Inhale 2 puffs every 6 (six) hours as needed for wheezing or shortness of breath 1 Inhaler 5    hydrOXYzine HCL (ATARAX) 25 mg tablet Take 1 tablet (25 mg total) by mouth every 6 (six) hours as needed for anxiety 30 tablet 0    venlafaxine 225 MG TB24 Take 1 tablet (225 mg total) by mouth daily 90 tablet 1     No current facility-administered medications for this visit  No Known Allergies    Review of Systems   Constitutional: Negative for chills, fatigue and fever  HENT: Negative for congestion, rhinorrhea and sore throat  Respiratory: Negative for cough, chest tightness and shortness of breath  Gastrointestinal: Negative for abdominal pain, diarrhea, nausea and vomiting  Musculoskeletal: Negative for myalgias  Neurological: Negative for headaches  Objective: There were no vitals filed for this visit  Physical Exam  Constitutional:       General: He is not in acute distress  Appearance: Normal appearance  He is not ill-appearing  Eyes:      Extraocular Movements: Extraocular movements intact  Conjunctiva/sclera: Conjunctivae normal    Neck:      Musculoskeletal: Normal range of motion  Pulmonary:      Effort: Pulmonary effort is normal  No respiratory distress  Skin:     Findings: No erythema  Neurological:      Mental Status: He is alert  Cranial Nerves: No dysarthria or facial asymmetry     Psychiatric:         Mood and Affect: Mood normal          Behavior: Behavior normal        VIRTUAL VISIT DISCLAIMER    Lars Kee acknowledges that he has consented to an online visit or consultation  He understands that the online visit is based solely on information provided by him, and that, in the absence of a face-to-face physical evaluation by the physician, the diagnosis he receives is both limited and provisional in terms of accuracy and completeness  This is not intended to replace a full medical face-to-face evaluation by the physician  Lars Kee understands and accepts these terms

## 2021-03-07 ENCOUNTER — PATIENT MESSAGE (OUTPATIENT)
Dept: FAMILY MEDICINE CLINIC | Facility: CLINIC | Age: 31
End: 2021-03-07

## 2021-03-07 DIAGNOSIS — F34.1 DYSTHYMIA: ICD-10-CM

## 2021-03-08 RX ORDER — VENLAFAXINE HYDROCHLORIDE 225 MG/1
225 TABLET, EXTENDED RELEASE ORAL DAILY
Qty: 90 TABLET | Refills: 1 | Status: SHIPPED | OUTPATIENT
Start: 2021-03-08 | End: 2021-08-24 | Stop reason: SDUPTHER

## 2021-03-10 DIAGNOSIS — Z23 ENCOUNTER FOR IMMUNIZATION: ICD-10-CM

## 2021-03-12 ENCOUNTER — IMMUNIZATIONS (OUTPATIENT)
Dept: FAMILY MEDICINE CLINIC | Facility: HOSPITAL | Age: 31
End: 2021-03-12

## 2021-03-12 DIAGNOSIS — Z23 ENCOUNTER FOR IMMUNIZATION: Primary | ICD-10-CM

## 2021-03-12 PROCEDURE — 91300 SARS-COV-2 / COVID-19 MRNA VACCINE (PFIZER-BIONTECH) 30 MCG: CPT

## 2021-03-12 PROCEDURE — 0001A SARS-COV-2 / COVID-19 MRNA VACCINE (PFIZER-BIONTECH) 30 MCG: CPT

## 2021-04-02 ENCOUNTER — IMMUNIZATIONS (OUTPATIENT)
Dept: FAMILY MEDICINE CLINIC | Facility: HOSPITAL | Age: 31
End: 2021-04-02

## 2021-04-02 DIAGNOSIS — Z23 ENCOUNTER FOR IMMUNIZATION: Primary | ICD-10-CM

## 2021-04-02 PROCEDURE — 0002A SARS-COV-2 / COVID-19 MRNA VACCINE (PFIZER-BIONTECH) 30 MCG: CPT

## 2021-04-02 PROCEDURE — 91300 SARS-COV-2 / COVID-19 MRNA VACCINE (PFIZER-BIONTECH) 30 MCG: CPT

## 2021-08-20 DIAGNOSIS — F34.1 DYSTHYMIA: ICD-10-CM

## 2021-08-20 NOTE — TELEPHONE ENCOUNTER
Left message for patient to call the office, does he need a refill? Please schedule physical after 09/02/2021

## 2021-08-24 DIAGNOSIS — F34.1 DYSTHYMIA: ICD-10-CM

## 2021-08-24 RX ORDER — VENLAFAXINE HYDROCHLORIDE 225 MG/1
225 TABLET, EXTENDED RELEASE ORAL DAILY
Qty: 90 TABLET | Refills: 1 | Status: SHIPPED | OUTPATIENT
Start: 2021-08-24 | End: 2022-03-16 | Stop reason: SDUPTHER

## 2021-09-21 RX ORDER — VENLAFAXINE HYDROCHLORIDE 225 MG/1
TABLET, EXTENDED RELEASE ORAL
Qty: 90 TABLET | Refills: 1 | OUTPATIENT
Start: 2021-09-21

## 2021-09-23 NOTE — PROGRESS NOTES
ANNUAL PHYSICAL    Date of Service: 21  Primary Care Provider:   Shay Stanley MD       Name: Sierra Jasso       : 1990       Age:31 y o  Sex: male      MRN: [id number]      Chief Complaint:Physical Exam     Assessment and Plan:  27 y o  male exam      1  Health Maintenance  - none indicated  - Immunizations: Reviewed  Recommend yearly flu vaccine  2  Other diagnoses addressed today:   Problem List Items Addressed This Visit        Other    Anxiety     With comorbid depression/dysthymia  Mostly controlled with Effexor, has rare anxiety attacks ( less than 3 in a year) that have not responded to hydroxine  Reviewed deep breathing exercises to try when feeling anxious  Counseled on lifestyle modifications  Class 2 severe obesity due to excess calories with serious comorbidity and body mass index (BMI) of 36 0 to 36 9 in [facility])    Tobacco use     Counseled on smoking cessation           Other Visit Diagnoses     Annual physical exam    -  Primary           Immunizations and preventive care screenings were discussed with patient today  Appropriate education was printed on patient's after visit summary  Counseling:  · Alcohol/drug use: discussed moderation in alcohol intake, the recommendations for healthy alcohol use, and avoidance of illicit drug use  · Dental Health: discussed importance of regular tooth brushing, flossing, and dental visits  · Injury prevention: discussed safety/seat belts, safety helmets, smoke detectors, carbon dioxide detectors, and smoking near bedding or upholstery  · Exercise: the importance of regular exercise/physical activity was discussed  Recommend exercise 3-5 times per week for at least 30 minutes  BMI Counseling: Body mass index is 36 26 kg/m²   The BMI is above normal  Nutrition recommendations include decreasing portion sizes, encouraging healthy choices of fruits and vegetables, decreasing fast food intake, consuming healthier snacks, limiting drinks that contain sugar, increasing intake of lean protein and reducing intake of saturated and trans fat  Exercise recommendations include exercising 3-5 times per week  Rationale for BMI follow-up plan is due to patient being overweight or obese  Tobacco Cessation Counseling: Tobacco cessation counseling was provided  The patient is sincerely urged to quit consumption of tobacco  He is not ready to quit tobacco  Medication options not discussed  RTC 1 year for annual  visit or sooner PRN    Subjective:    Teresa Lopez is a 27 y o  male and is here for his comprehensive physical exam      Acute complaints:     He reports that his dentist told him he had high blood pressure  He reports that hydroxyzine has not been helpful for anxiety attacks  He does not have anxiety attacks often, only about 3 times in the last year  He smokes 2 packs of cigarettes a week on the weekends  Diet and Physical Activity  · Diet/Nutrition: does not follow a well balanced diet  · Exercise: no formal exercise  General Health  · Vision: no vision problems and goes for regular eye exams  · Dental: regular dental visits          Histories Updated and Reviewed 9/24/2021:  Patient's Medications   New Prescriptions    No medications on file   Previous Medications    ALBUTEROL (PROVENTIL HFA,VENTOLIN HFA) 90 MCG/ACT INHALER    Inhale 2 puffs every 6 (six) hours as needed for wheezing or shortness of breath    HYDROXYZINE HCL (ATARAX) 25 MG TABLET    Take 1 tablet (25 mg total) by mouth every 6 (six) hours as needed for anxiety    VENLAFAXINE 225 MG TB24    Take 1 tablet (225 mg total) by mouth daily   Modified Medications    No medications on file   Discontinued Medications    No medications on file     No Known Allergies  Past Medical History:   Diagnosis Date    Anxiety     Flu     Rotator cuff tendinitis, left     last assessed 11/4/13  documented resolved 9/12/14     Social History Socioeconomic History    Marital status:      Spouse name: Not on file    Number of children: Not on file    Years of education: Not on file    Highest education level: Not on file   Occupational History    Not on file   Tobacco Use    Smoking status: Current Some Day Smoker     Packs/day: 0 00     Years: 10 00     Pack years: 0 00     Types: Cigarettes     Last attempt to quit: 2020     Years since quittin 3    Smokeless tobacco: Never Used   Substance and Sexual Activity    Alcohol use: Yes     Comment: SOCIAL    Drug use: No    Sexual activity: Not on file   Other Topics Concern    Not on file   Social History Narrative    Not on file     Social Determinants of Health     Financial Resource Strain:     Difficulty of Paying Living Expenses:    Food Insecurity:     Worried About Running Out of Food in the Last Year:     Ran Out of Food in the Last Year:    Transportation Needs:     Lack of Transportation (Medical):      Lack of Transportation (Non-Medical):    Physical Activity:     Days of Exercise per Week:     Minutes of Exercise per Session:    Stress:     Feeling of Stress :    Social Connections:     Frequency of Communication with Friends and Family:     Frequency of Social Gatherings with Friends and Family:     Attends Sikh Services:     Active Member of Clubs or Organizations:     Attends Club or Organization Meetings:     Marital Status:    Intimate Partner Violence:     Fear of Current or Ex-Partner:     Emotionally Abused:     Physically Abused:     Sexually Abused:      Immunization History   Administered Date(s) Administered    INFLUENZA 10/05/2018, 10/30/2018    Influenza, seasonal, injectable 2010, 2012    SARS-CoV-2 / COVID-19 mRNA IM (Pf[employer]) 2021, 2021       PHQ-9 Depression Screening    PHQ-9:   Frequency of the following problems over the past two weeks:      Little interest or pleasure in doing things: 1 - several days  Feeling down, depressed, or hopeless: 1 - several days  Trouble falling or staying asleep, or sleeping too much: 3 - nearly every day  Feeling tired or having little energy: 3 - nearly every day  Poor appetite or overeating: 3 - nearly every day  Feeling bad about yourself - or that you are a failure or have let yourself or your family down: 1 - several days  Trouble concentrating on things, such as reading the newspaper or watching television: 0 - not at all  Moving or speaking so slowly that other people could have noticed  Or the opposite - being so fidgety or restless that you have been moving around a lot more than usual: 0 - not at all  Thoughts that you would be better off dead, or of hurting yourself in some way: 0 - not at all  PHQ-2 Score: 2  PHQ-9 Score: 12         Objective:  /74   Pulse 84   Temp (!) 97 °F (36 1 °C)   Resp 16   Ht 5' 11" (1 803 m)   Wt 118 kg (260 lb)   BMI 36 26 kg/m²   BP Readings from Last 3 Encounters:   09/24/21 118/74   09/02/20 122/78   10/08/19 118/72      Wt Readings from Last 3 Encounters:   09/24/21 118 kg (260 lb)   09/02/20 116 kg (256 lb)   10/08/19 108 kg (239 lb)      Physical Exam  Constitutional:       General: He is not in acute distress  Appearance: Normal appearance  He is obese  He is not ill-appearing or toxic-appearing  HENT:      Head: Normocephalic and atraumatic  Right Ear: Tympanic membrane, ear canal and external ear normal       Left Ear: Tympanic membrane, ear canal and external ear normal    Eyes:      Extraocular Movements: Extraocular movements intact  Conjunctiva/sclera: Conjunctivae normal    Cardiovascular:      Rate and Rhythm: Normal rate and regular rhythm  Pulses: Normal pulses  Heart sounds: Normal heart sounds  No murmur heard  No friction rub  No gallop  Pulmonary:      Effort: Pulmonary effort is normal  No respiratory distress  Breath sounds: Normal breath sounds     Abdominal: General: There is no distension  Palpations: Abdomen is soft  Tenderness: There is no abdominal tenderness  Musculoskeletal:      Cervical back: Normal range of motion and neck supple  No rigidity  Right lower leg: No edema  Left lower leg: No edema  Skin:     General: Skin is warm and dry  Findings: No erythema or rash  Neurological:      General: No focal deficit present  Mental Status: He is alert and oriented to person, place, and time  Psychiatric:         Attention and Perception: Attention normal          Mood and Affect: Mood is depressed  Affect is flat  Speech: Speech normal          Behavior: Behavior normal          Patient Care Team:  Demarco Mejnivar MD as PCP - General (Family Medicine)  María Giles MD as PCP - PCP-[facility] Jr Simmons MD    Note: Portions of the record may have been created with voice recognition software  Occasional wrong word or "sound a like" substitutions may have occurred due to the inherent limitations of voice recognition software  Read the chart carefully and recognize, using context, where substitutions have occurred

## 2021-09-24 ENCOUNTER — OFFICE VISIT (OUTPATIENT)
Dept: FAMILY MEDICINE CLINIC | Facility: CLINIC | Age: 31
End: 2021-09-24
Payer: COMMERCIAL

## 2021-09-24 VITALS
TEMPERATURE: 97 F | BODY MASS INDEX: 36.4 KG/M2 | DIASTOLIC BLOOD PRESSURE: 74 MMHG | HEART RATE: 84 BPM | RESPIRATION RATE: 16 BRPM | SYSTOLIC BLOOD PRESSURE: 118 MMHG | WEIGHT: 260 LBS | HEIGHT: 71 IN

## 2021-09-24 DIAGNOSIS — Z72.0 TOBACCO USE: ICD-10-CM

## 2021-09-24 DIAGNOSIS — Z00.00 ANNUAL PHYSICAL EXAM: Primary | ICD-10-CM

## 2021-09-24 DIAGNOSIS — E66.01 CLASS 2 SEVERE OBESITY DUE TO EXCESS CALORIES WITH SERIOUS COMORBIDITY AND BODY MASS INDEX (BMI) OF 36.0 TO 36.9 IN ADULT (HCC): ICD-10-CM

## 2021-09-24 DIAGNOSIS — F41.9 ANXIETY: ICD-10-CM

## 2021-09-24 PROBLEM — F32.1 CURRENT MODERATE EPISODE OF MAJOR DEPRESSIVE DISORDER WITHOUT PRIOR EPISODE (HCC): Status: ACTIVE | Noted: 2019-05-29

## 2021-09-24 PROCEDURE — 99395 PREV VISIT EST AGE 18-39: CPT | Performed by: FAMILY MEDICINE

## 2021-09-24 PROCEDURE — 3008F BODY MASS INDEX DOCD: CPT | Performed by: FAMILY MEDICINE

## 2021-09-24 PROCEDURE — 4004F PT TOBACCO SCREEN RCVD TLK: CPT | Performed by: FAMILY MEDICINE

## 2021-09-24 PROCEDURE — 3725F SCREEN DEPRESSION PERFORMED: CPT | Performed by: FAMILY MEDICINE

## 2021-09-24 NOTE — ASSESSMENT & PLAN NOTE
With comorbid depression/dysthymia  Mostly controlled with Effexor, has rare anxiety attacks ( less than 3 in a year) that have not responded to hydroxine  Reviewed deep breathing exercises to try when feeling anxious  Counseled on lifestyle modifications

## 2021-12-14 ENCOUNTER — PATIENT MESSAGE (OUTPATIENT)
Dept: FAMILY MEDICINE CLINIC | Facility: CLINIC | Age: 31
End: 2021-12-14

## 2021-12-14 DIAGNOSIS — Z72.0 TOBACCO USE: Primary | ICD-10-CM

## 2021-12-14 RX ORDER — VARENICLINE TARTRATE 25 MG
KIT ORAL
Qty: 53 TABLET | Refills: 0 | Status: SHIPPED | OUTPATIENT
Start: 2021-12-14 | End: 2022-03-07

## 2022-03-07 RX ORDER — VARENICLINE TARTRATE 1 MG/1
1 TABLET, FILM COATED ORAL 2 TIMES DAILY
Qty: 60 TABLET | Refills: 1 | Status: SHIPPED | OUTPATIENT
Start: 2022-03-07

## 2022-03-16 DIAGNOSIS — F34.1 DYSTHYMIA: ICD-10-CM

## 2022-03-16 RX ORDER — VENLAFAXINE HYDROCHLORIDE 225 MG/1
225 TABLET, EXTENDED RELEASE ORAL DAILY
Qty: 90 TABLET | Refills: 1 | Status: SHIPPED | OUTPATIENT
Start: 2022-03-16

## 2023-12-01 ENCOUNTER — TELEPHONE (OUTPATIENT)
Dept: OBGYN CLINIC | Facility: CLINIC | Age: 33
End: 2023-12-01

## 2023-12-01 NOTE — TELEPHONE ENCOUNTER
Patients partner (Joycelyn Velasco 7/2/90) is seen in our office for OB care. Patient and partner had chromosome analysis completed for partners current pregnancy. Spoke to patient and obtained permission to access his chart and have provider review results. Queried care everywhere. Will route message to provide for review.

## 2023-12-01 NOTE — TELEPHONE ENCOUNTER
Placed call to patient, reviewed lab results and follow up instructions. Patient verbalized understanding and had no additional questions.

## 2025-02-05 NOTE — ASSESSMENT & PLAN NOTE
· Previous sleep study reviewed  Followed by Dr Denise Thapa   · He has had no significant evidence of sleep apnea on prior testing but reports significant weight gain since then  Wife reports significant snoring however  · Presently will defer follow-up until respiratory symptoms under better control    He will consider whether to return to Dr Denise Thapa or have repeat polysomnography after asthma symptoms have been addressed
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
700
1st Trimester Sonogram/20 Week Level II Sonogram/Fetal Non-Stress Test (NST)/Ultra Screen at 12 Weeks